# Patient Record
Sex: FEMALE | Race: WHITE | Employment: FULL TIME | URBAN - METROPOLITAN AREA
[De-identification: names, ages, dates, MRNs, and addresses within clinical notes are randomized per-mention and may not be internally consistent; named-entity substitution may affect disease eponyms.]

---

## 2017-01-10 DIAGNOSIS — M79.605 LUMBAR PAIN WITH RADIATION DOWN LEFT LEG: ICD-10-CM

## 2017-01-10 DIAGNOSIS — M54.50 LUMBAR PAIN WITH RADIATION DOWN LEFT LEG: ICD-10-CM

## 2017-01-10 DIAGNOSIS — M51.36 DDD (DEGENERATIVE DISC DISEASE), LUMBAR: ICD-10-CM

## 2017-01-10 RX ORDER — IBUPROFEN 800 MG/1
TABLET ORAL
Qty: 90 TABLET | Refills: 2 | Status: SHIPPED | OUTPATIENT
Start: 2017-01-10 | End: 2017-02-02

## 2017-02-02 ENCOUNTER — OFFICE VISIT (OUTPATIENT)
Dept: PRIMARY CARE CLINIC | Age: 57
End: 2017-02-02

## 2017-02-02 VITALS
RESPIRATION RATE: 16 BRPM | WEIGHT: 183 LBS | BODY MASS INDEX: 28.72 KG/M2 | DIASTOLIC BLOOD PRESSURE: 80 MMHG | HEART RATE: 50 BPM | HEIGHT: 67 IN | TEMPERATURE: 97.5 F | OXYGEN SATURATION: 99 % | SYSTOLIC BLOOD PRESSURE: 110 MMHG

## 2017-02-02 DIAGNOSIS — E78.2 MIXED HYPERLIPIDEMIA: ICD-10-CM

## 2017-02-02 DIAGNOSIS — I10 HTN (HYPERTENSION), BENIGN: ICD-10-CM

## 2017-02-02 DIAGNOSIS — M51.36 DDD (DEGENERATIVE DISC DISEASE), LUMBAR: Primary | ICD-10-CM

## 2017-02-02 DIAGNOSIS — J43.2 CENTRILOBULAR EMPHYSEMA (HCC): ICD-10-CM

## 2017-02-02 DIAGNOSIS — F17.200 SMOKING: ICD-10-CM

## 2017-02-02 DIAGNOSIS — M19.90 ARTHRITIS: ICD-10-CM

## 2017-02-02 PROCEDURE — 99213 OFFICE O/P EST LOW 20 MIN: CPT | Performed by: FAMILY MEDICINE

## 2017-02-02 RX ORDER — POTASSIUM CHLORIDE 750 MG/1
10 TABLET, EXTENDED RELEASE ORAL DAILY
Qty: 30 TABLET | Refills: 3 | Status: SHIPPED | OUTPATIENT
Start: 2017-02-02 | End: 2018-04-25

## 2017-02-02 RX ORDER — TRAMADOL HYDROCHLORIDE 50 MG/1
TABLET ORAL
Qty: 180 TABLET | Refills: 0 | Status: SHIPPED | OUTPATIENT
Start: 2017-02-02 | End: 2017-03-29 | Stop reason: SDUPTHER

## 2017-02-02 RX ORDER — ALBUTEROL SULFATE 2.5 MG/3ML
2.5 SOLUTION RESPIRATORY (INHALATION) EVERY 6 HOURS PRN
Qty: 120 EACH | Refills: 2 | Status: SHIPPED | OUTPATIENT
Start: 2017-02-02 | End: 2017-02-02

## 2017-02-02 ASSESSMENT — ENCOUNTER SYMPTOMS
CONSTIPATION: 0
ABDOMINAL PAIN: 0
DIARRHEA: 0

## 2017-02-12 ASSESSMENT — ENCOUNTER SYMPTOMS
SHORTNESS OF BREATH: 0
BACK PAIN: 1

## 2017-03-28 DIAGNOSIS — M19.90 ARTHRITIS: ICD-10-CM

## 2017-03-28 RX ORDER — TRAMADOL HYDROCHLORIDE 50 MG/1
TABLET ORAL
Qty: 180 TABLET | Refills: 0 | OUTPATIENT
Start: 2017-03-28

## 2017-03-28 RX ORDER — AMLODIPINE BESYLATE 5 MG/1
TABLET ORAL
Qty: 30 TABLET | Refills: 3 | OUTPATIENT
Start: 2017-03-28

## 2017-03-29 RX ORDER — AMLODIPINE BESYLATE 5 MG/1
TABLET ORAL
Qty: 30 TABLET | Refills: 3 | Status: SHIPPED | OUTPATIENT
Start: 2017-03-29 | End: 2017-08-30 | Stop reason: SDUPTHER

## 2017-03-29 RX ORDER — TRAMADOL HYDROCHLORIDE 50 MG/1
TABLET ORAL
Qty: 60 TABLET | Refills: 0 | Status: SHIPPED | OUTPATIENT
Start: 2017-03-29 | End: 2017-04-18 | Stop reason: SDUPTHER

## 2017-04-18 DIAGNOSIS — E78.2 MIXED HYPERLIPIDEMIA: ICD-10-CM

## 2017-04-18 DIAGNOSIS — M19.90 ARTHRITIS: ICD-10-CM

## 2017-04-18 LAB
ALBUMIN SERPL-MCNC: 4.4 G/DL (ref 3.9–4.9)
ALP BLD-CCNC: 58 U/L (ref 40–130)
ALT SERPL-CCNC: 26 U/L (ref 0–33)
ANION GAP SERPL CALCULATED.3IONS-SCNC: 16 MEQ/L (ref 7–13)
AST SERPL-CCNC: 23 U/L (ref 0–35)
BILIRUB SERPL-MCNC: 0.3 MG/DL (ref 0–1.2)
BUN BLDV-MCNC: 13 MG/DL (ref 6–20)
CALCIUM SERPL-MCNC: 10 MG/DL (ref 8.6–10.2)
CHLORIDE BLD-SCNC: 97 MEQ/L (ref 98–107)
CHOLESTEROL, TOTAL: 189 MG/DL (ref 0–199)
CO2: 24 MEQ/L (ref 22–29)
CREAT SERPL-MCNC: 0.78 MG/DL (ref 0.5–0.9)
GFR AFRICAN AMERICAN: >60
GFR NON-AFRICAN AMERICAN: >60
GLOBULIN: 2.6 G/DL (ref 2.3–3.5)
GLUCOSE BLD-MCNC: 110 MG/DL (ref 74–109)
HDLC SERPL-MCNC: 59 MG/DL (ref 40–59)
LDL CHOLESTEROL CALCULATED: 105 MG/DL (ref 0–129)
POTASSIUM SERPL-SCNC: 3.6 MEQ/L (ref 3.5–5.1)
SODIUM BLD-SCNC: 137 MEQ/L (ref 132–144)
TOTAL PROTEIN: 7 G/DL (ref 6.4–8.1)
TRIGL SERPL-MCNC: 127 MG/DL (ref 0–200)

## 2017-04-18 RX ORDER — TRAMADOL HYDROCHLORIDE 50 MG/1
TABLET ORAL
Qty: 60 TABLET | Refills: 0 | Status: SHIPPED | OUTPATIENT
Start: 2017-04-18 | End: 2017-05-19 | Stop reason: SDUPTHER

## 2017-04-24 DIAGNOSIS — I10 HTN (HYPERTENSION), BENIGN: ICD-10-CM

## 2017-04-25 RX ORDER — BENAZEPRIL HYDROCHLORIDE 40 MG/1
TABLET, FILM COATED ORAL
Qty: 90 TABLET | Refills: 1 | Status: SHIPPED | OUTPATIENT
Start: 2017-04-25 | End: 2017-09-18 | Stop reason: SDUPTHER

## 2017-05-10 DIAGNOSIS — I10 HTN (HYPERTENSION), BENIGN: ICD-10-CM

## 2017-05-10 RX ORDER — BISOPROLOL FUMARATE AND HYDROCHLOROTHIAZIDE 5; 6.25 MG/1; MG/1
TABLET ORAL
Qty: 90 TABLET | Refills: 1 | Status: SHIPPED | OUTPATIENT
Start: 2017-05-10 | End: 2017-11-03 | Stop reason: SDUPTHER

## 2017-05-19 DIAGNOSIS — M19.90 ARTHRITIS: ICD-10-CM

## 2017-05-19 RX ORDER — TRAMADOL HYDROCHLORIDE 50 MG/1
TABLET ORAL
Qty: 60 TABLET | Refills: 1 | Status: SHIPPED | OUTPATIENT
Start: 2017-05-19 | End: 2017-06-19 | Stop reason: SDUPTHER

## 2017-06-09 DIAGNOSIS — I10 HTN (HYPERTENSION), BENIGN: ICD-10-CM

## 2017-06-09 RX ORDER — HYDROCHLOROTHIAZIDE 25 MG/1
TABLET ORAL
Qty: 90 TABLET | Refills: 0 | Status: SHIPPED | OUTPATIENT
Start: 2017-06-09 | End: 2017-08-21 | Stop reason: SDUPTHER

## 2017-06-19 ENCOUNTER — OFFICE VISIT (OUTPATIENT)
Dept: PRIMARY CARE CLINIC | Age: 57
End: 2017-06-19

## 2017-06-19 VITALS
RESPIRATION RATE: 16 BRPM | SYSTOLIC BLOOD PRESSURE: 118 MMHG | HEART RATE: 72 BPM | OXYGEN SATURATION: 99 % | DIASTOLIC BLOOD PRESSURE: 72 MMHG | HEIGHT: 67 IN | WEIGHT: 181 LBS | TEMPERATURE: 98.2 F | BODY MASS INDEX: 28.41 KG/M2

## 2017-06-19 DIAGNOSIS — M51.36 DDD (DEGENERATIVE DISC DISEASE), LUMBAR: ICD-10-CM

## 2017-06-19 DIAGNOSIS — I10 HTN (HYPERTENSION), BENIGN: ICD-10-CM

## 2017-06-19 DIAGNOSIS — M19.90 ARTHRITIS: ICD-10-CM

## 2017-06-19 DIAGNOSIS — M79.672 FOOT PAIN, LEFT: Primary | ICD-10-CM

## 2017-06-19 PROCEDURE — 99213 OFFICE O/P EST LOW 20 MIN: CPT | Performed by: FAMILY MEDICINE

## 2017-06-19 RX ORDER — IBUPROFEN 800 MG/1
1 TABLET ORAL 3 TIMES DAILY
Refills: 2 | COMMUNITY
Start: 2017-06-13 | End: 2017-09-18

## 2017-06-19 RX ORDER — TRAMADOL HYDROCHLORIDE 50 MG/1
TABLET ORAL
Qty: 60 TABLET | Refills: 1 | Status: SHIPPED | OUTPATIENT
Start: 2017-06-19 | End: 2017-07-24 | Stop reason: SDUPTHER

## 2017-06-19 ASSESSMENT — ENCOUNTER SYMPTOMS
NAUSEA: 0
ABDOMINAL PAIN: 0
VOMITING: 0
DIARRHEA: 0
SORE THROAT: 0
CONSTIPATION: 0
COUGH: 0
SHORTNESS OF BREATH: 0
SINUS PRESSURE: 0
BACK PAIN: 0
RESPIRATORY NEGATIVE: 1
WHEEZING: 0

## 2017-06-19 ASSESSMENT — PATIENT HEALTH QUESTIONNAIRE - PHQ9
SUM OF ALL RESPONSES TO PHQ9 QUESTIONS 1 & 2: 0
SUM OF ALL RESPONSES TO PHQ QUESTIONS 1-9: 0
2. FEELING DOWN, DEPRESSED OR HOPELESS: 0
1. LITTLE INTEREST OR PLEASURE IN DOING THINGS: 0

## 2017-07-24 DIAGNOSIS — M19.90 ARTHRITIS: ICD-10-CM

## 2017-07-24 RX ORDER — TRAMADOL HYDROCHLORIDE 50 MG/1
TABLET ORAL
Qty: 60 TABLET | Refills: 0 | Status: SHIPPED | OUTPATIENT
Start: 2017-07-24 | End: 2017-08-07 | Stop reason: SDUPTHER

## 2017-08-10 DIAGNOSIS — M54.50 LUMBAR PAIN WITH RADIATION DOWN LEFT LEG: ICD-10-CM

## 2017-08-10 DIAGNOSIS — M79.605 LUMBAR PAIN WITH RADIATION DOWN LEFT LEG: ICD-10-CM

## 2017-08-10 DIAGNOSIS — M51.36 DDD (DEGENERATIVE DISC DISEASE), LUMBAR: ICD-10-CM

## 2017-08-10 DIAGNOSIS — M19.90 ARTHRITIS: ICD-10-CM

## 2017-08-10 NOTE — TELEPHONE ENCOUNTER
From: Naty Silverio  To: Brunilda Mortimer, DO  Sent: 8/10/2017 8:36 AM EDT  Subject: Medication Renewal Request    Original authorizing provider: Brunilda Mortimer, DO Levada Blood would like a refill of the following medications:  ibuprofen (ADVIL;MOTRIN) 800 MG tablet Brunilda Mortimer, DO]  traMADol Nii Anchors) 50 MG tablet Brunilda Mortimer, DO]    Preferred pharmacy: [facility] [place] #84 - [address] [facility]. 97. [phone]    Comment:  need refills

## 2017-08-11 RX ORDER — IBUPROFEN 800 MG/1
TABLET ORAL
Qty: 90 TABLET | Refills: 3 | OUTPATIENT
Start: 2017-08-11

## 2017-08-11 RX ORDER — TRAMADOL HYDROCHLORIDE 50 MG/1
TABLET ORAL
Qty: 60 TABLET | Refills: 0 | OUTPATIENT
Start: 2017-08-11

## 2017-08-21 DIAGNOSIS — I10 HTN (HYPERTENSION), BENIGN: ICD-10-CM

## 2017-08-21 RX ORDER — HYDROCHLOROTHIAZIDE 25 MG/1
TABLET ORAL
Qty: 90 TABLET | Refills: 1 | Status: SHIPPED | OUTPATIENT
Start: 2017-08-21 | End: 2017-11-07 | Stop reason: SDUPTHER

## 2017-08-30 RX ORDER — AMLODIPINE BESYLATE 5 MG/1
TABLET ORAL
Qty: 30 TABLET | Refills: 5 | Status: SHIPPED | OUTPATIENT
Start: 2017-08-30 | End: 2018-01-25

## 2017-09-18 ENCOUNTER — OFFICE VISIT (OUTPATIENT)
Dept: PRIMARY CARE CLINIC | Age: 57
End: 2017-09-18

## 2017-09-18 VITALS
WEIGHT: 180 LBS | RESPIRATION RATE: 16 BRPM | SYSTOLIC BLOOD PRESSURE: 126 MMHG | OXYGEN SATURATION: 96 % | HEIGHT: 68 IN | DIASTOLIC BLOOD PRESSURE: 74 MMHG | HEART RATE: 87 BPM | TEMPERATURE: 98.7 F | BODY MASS INDEX: 27.28 KG/M2

## 2017-09-18 DIAGNOSIS — M79.672 FOOT PAIN, LEFT: Primary | ICD-10-CM

## 2017-09-18 DIAGNOSIS — M19.90 ARTHRITIS: ICD-10-CM

## 2017-09-18 DIAGNOSIS — I10 HTN (HYPERTENSION), BENIGN: ICD-10-CM

## 2017-09-18 PROCEDURE — 99214 OFFICE O/P EST MOD 30 MIN: CPT | Performed by: FAMILY MEDICINE

## 2017-09-18 RX ORDER — TRAMADOL HYDROCHLORIDE 50 MG/1
50-100 TABLET ORAL EVERY 6 HOURS PRN
Qty: 120 TABLET | Refills: 1 | Status: SHIPPED | OUTPATIENT
Start: 2017-09-18 | End: 2017-11-07 | Stop reason: SDUPTHER

## 2017-09-18 RX ORDER — LIDOCAINE 50 MG/G
1 PATCH TOPICAL DAILY
Qty: 30 PATCH | Refills: 0 | Status: SHIPPED | OUTPATIENT
Start: 2017-09-18 | End: 2018-11-26 | Stop reason: SDUPTHER

## 2017-09-18 RX ORDER — TRAMADOL HYDROCHLORIDE 50 MG/1
TABLET ORAL
Qty: 60 TABLET | Refills: 1 | Status: SHIPPED | OUTPATIENT
Start: 2017-09-18 | End: 2017-09-18

## 2017-09-18 RX ORDER — METHOCARBAMOL 500 MG/1
500-1000 TABLET, FILM COATED ORAL 3 TIMES DAILY
Qty: 60 TABLET | Refills: 1 | Status: SHIPPED | OUTPATIENT
Start: 2017-09-18 | End: 2017-12-22 | Stop reason: SDUPTHER

## 2017-09-18 RX ORDER — BENAZEPRIL HYDROCHLORIDE 40 MG/1
TABLET, FILM COATED ORAL
Qty: 90 TABLET | Refills: 1 | Status: SHIPPED | OUTPATIENT
Start: 2017-09-18 | End: 2017-11-07 | Stop reason: SDUPTHER

## 2017-09-18 RX ORDER — CYCLOBENZAPRINE HCL 5 MG
5 TABLET ORAL 3 TIMES DAILY PRN
Status: CANCELLED | OUTPATIENT
Start: 2017-09-18 | End: 2017-09-28

## 2017-09-18 ASSESSMENT — ENCOUNTER SYMPTOMS
BACK PAIN: 0
DIARRHEA: 0
ORTHOPNEA: 0
RESPIRATORY NEGATIVE: 1
SORE THROAT: 0
CONSTIPATION: 0
VOMITING: 0
WHEEZING: 0
BLURRED VISION: 0
NAUSEA: 0
SHORTNESS OF BREATH: 0
COUGH: 0
ABDOMINAL PAIN: 0
SINUS PRESSURE: 0

## 2017-10-27 DIAGNOSIS — M51.36 DDD (DEGENERATIVE DISC DISEASE), LUMBAR: ICD-10-CM

## 2017-10-27 DIAGNOSIS — M79.605 LUMBAR PAIN WITH RADIATION DOWN LEFT LEG: ICD-10-CM

## 2017-10-27 DIAGNOSIS — M54.50 LUMBAR PAIN WITH RADIATION DOWN LEFT LEG: ICD-10-CM

## 2017-10-27 NOTE — TELEPHONE ENCOUNTER
From: Naty Blood  Sent: 10/27/2017 10:30 AM EDT  Subject: Medication Renewal Request    Naty Nusrat would like a refill of the following medications:  ibuprofen (ADVIL;MOTRIN) 800 MG tablet Brunilda Mortimer, DO]    Preferred pharmacy: [facility] #84 - [address] [facility]. 97. [phone]    Comment:  need refill

## 2017-10-29 DIAGNOSIS — M51.36 DDD (DEGENERATIVE DISC DISEASE), LUMBAR: ICD-10-CM

## 2017-10-29 DIAGNOSIS — M79.605 LUMBAR PAIN WITH RADIATION DOWN LEFT LEG: ICD-10-CM

## 2017-10-29 DIAGNOSIS — M54.50 LUMBAR PAIN WITH RADIATION DOWN LEFT LEG: ICD-10-CM

## 2017-10-29 RX ORDER — IBUPROFEN 800 MG/1
800 TABLET ORAL 3 TIMES DAILY
Qty: 90 TABLET | Refills: 2 | Status: SHIPPED | OUTPATIENT
Start: 2017-10-29 | End: 2018-04-25 | Stop reason: SDUPTHER

## 2017-10-30 RX ORDER — IBUPROFEN 800 MG/1
TABLET ORAL
Qty: 90 TABLET | Refills: 1 | Status: SHIPPED | OUTPATIENT
Start: 2017-10-30 | End: 2017-12-22 | Stop reason: SDUPTHER

## 2017-11-03 DIAGNOSIS — I10 HTN (HYPERTENSION), BENIGN: ICD-10-CM

## 2017-11-04 RX ORDER — BISOPROLOL FUMARATE AND HYDROCHLOROTHIAZIDE 5; 6.25 MG/1; MG/1
TABLET ORAL
Qty: 90 TABLET | Refills: 1 | Status: SHIPPED | OUTPATIENT
Start: 2017-11-04 | End: 2018-05-11 | Stop reason: SDUPTHER

## 2017-11-07 DIAGNOSIS — M79.672 FOOT PAIN, LEFT: ICD-10-CM

## 2017-11-07 DIAGNOSIS — M19.90 ARTHRITIS: ICD-10-CM

## 2017-11-07 DIAGNOSIS — I10 HTN (HYPERTENSION), BENIGN: ICD-10-CM

## 2017-11-07 RX ORDER — BENAZEPRIL HYDROCHLORIDE 40 MG/1
TABLET, FILM COATED ORAL
Qty: 90 TABLET | Refills: 1 | Status: SHIPPED | OUTPATIENT
Start: 2017-11-07 | End: 2018-09-14

## 2017-11-07 RX ORDER — TRAMADOL HYDROCHLORIDE 50 MG/1
50-100 TABLET ORAL EVERY 6 HOURS PRN
Qty: 120 TABLET | Refills: 1 | Status: SHIPPED | OUTPATIENT
Start: 2017-11-07 | End: 2017-12-22 | Stop reason: SDUPTHER

## 2017-11-07 RX ORDER — HYDROCHLOROTHIAZIDE 25 MG/1
TABLET ORAL
Qty: 90 TABLET | Refills: 1 | Status: SHIPPED | OUTPATIENT
Start: 2017-11-07 | End: 2018-04-25

## 2017-11-07 NOTE — TELEPHONE ENCOUNTER
From: Micheal Julien  Sent: 11/7/2017 6:13 AM EST  Subject: Medication Renewal Request    Micheal Julien would like a refill of the following medications:  hydrochlorothiazide (HYDRODIURIL) 25 MG tablet Emily Ludwig DO]  benazepril (LOTENSIN) 40 MG tablet Emily Ludwig DO]  traMADol Narayan Kathrin) 50 MG tablet Emily Ludwig DO]    Preferred pharmacy: [facility] [place] #84 - [address] [facility]. 97. [phone]    Comment:  Need refills Cold weather kicking my back, hip

## 2017-12-15 ENCOUNTER — OFFICE VISIT (OUTPATIENT)
Dept: PRIMARY CARE CLINIC | Age: 57
End: 2017-12-15

## 2017-12-15 VITALS
SYSTOLIC BLOOD PRESSURE: 118 MMHG | HEIGHT: 67 IN | TEMPERATURE: 98.2 F | HEART RATE: 73 BPM | WEIGHT: 182 LBS | DIASTOLIC BLOOD PRESSURE: 74 MMHG | RESPIRATION RATE: 18 BRPM | BODY MASS INDEX: 28.56 KG/M2 | OXYGEN SATURATION: 97 %

## 2017-12-15 DIAGNOSIS — M51.36 DDD (DEGENERATIVE DISC DISEASE), LUMBAR: ICD-10-CM

## 2017-12-15 DIAGNOSIS — M54.16 LUMBAR BACK PAIN WITH RADICULOPATHY AFFECTING LEFT LOWER EXTREMITY: Primary | ICD-10-CM

## 2017-12-15 DIAGNOSIS — I10 HTN (HYPERTENSION), BENIGN: ICD-10-CM

## 2017-12-15 PROCEDURE — 99214 OFFICE O/P EST MOD 30 MIN: CPT | Performed by: FAMILY MEDICINE

## 2017-12-15 RX ORDER — HYDROCODONE BITARTRATE AND ACETAMINOPHEN 5; 325 MG/1; MG/1
1 TABLET ORAL EVERY 6 HOURS PRN
Status: CANCELLED | OUTPATIENT
Start: 2017-12-15 | End: 2017-12-22

## 2017-12-15 RX ORDER — PREGABALIN 75 MG/1
75 CAPSULE ORAL 2 TIMES DAILY
Qty: 28 CAPSULE | Refills: 0 | Status: SHIPPED | OUTPATIENT
Start: 2017-12-15 | End: 2018-01-25

## 2017-12-15 RX ORDER — PREGABALIN 75 MG/1
75 CAPSULE ORAL 2 TIMES DAILY
Qty: 60 CAPSULE | Refills: 2 | Status: SHIPPED | OUTPATIENT
Start: 2017-12-15 | End: 2018-01-25

## 2017-12-15 RX ORDER — PREDNISONE 10 MG/1
TABLET ORAL
Qty: 20 TABLET | Refills: 0 | Status: SHIPPED | OUTPATIENT
Start: 2017-12-15 | End: 2017-12-29

## 2017-12-15 ASSESSMENT — ENCOUNTER SYMPTOMS
PHOTOPHOBIA: 0
RESPIRATORY NEGATIVE: 1
EYES NEGATIVE: 1
ABDOMINAL PAIN: 0
SHORTNESS OF BREATH: 0
GASTROINTESTINAL NEGATIVE: 1
BACK PAIN: 0
WHEEZING: 0
CONSTIPATION: 0
DIARRHEA: 0
EYE ITCHING: 0
EYE REDNESS: 0

## 2017-12-15 NOTE — PROGRESS NOTES
Subjective:      Patient ID: Zaid Almeida is a 56 y.o. female who presents today for:  Chief Complaint   Patient presents with    Pain     x 2 months pt has c/o ledt sided siatica pain. patinet states has not changed and is not improving. pt is requesting a Rx for vicodin. paitne is currently takin tramadol and ibuprofen with no relief. Leg Pain    The incident occurred more than 1 week ago. There was no injury mechanism. The pain is present in the left leg, left thigh and right thigh. The quality of the pain is described as aching. The pain is at a severity of 4/10. Pain severity now: mild to moderate. The pain has been constant since onset. Associated symptoms include numbness and tingling. Pertinent negatives include no inability to bear weight, loss of motion, loss of sensation or muscle weakness. She reports no foreign bodies present. The symptoms are aggravated by movement and weight bearing. She has tried acetaminophen (Tramadol) for the symptoms. The treatment provided no relief. Past Medical History:   Diagnosis Date    Anemia     Arthritis     Hypertension     Smoking      Past Surgical History:   Procedure Laterality Date    COLONOSCOPY  12/02/2016    JAIMIE BANUELOS MD    DILATION AND CURETTAGE OF UTERUS       History reviewed. No pertinent family history. Social History     Social History    Marital status:      Spouse name: N/A    Number of children: N/A    Years of education: N/A     Occupational History    Not on file. Social History Main Topics    Smoking status: Current Every Day Smoker     Packs/day: 0.30     Types: Cigarettes    Smokeless tobacco: Never Used    Alcohol use Yes    Drug use: No    Sexual activity: Not on file     Other Topics Concern    Not on file     Social History Narrative    No narrative on file     Allergies:  Review of patient's allergies indicates not on file. Review of Systems   Constitutional: Negative.   Negative for activity

## 2017-12-22 DIAGNOSIS — M79.605 LUMBAR PAIN WITH RADIATION DOWN LEFT LEG: ICD-10-CM

## 2017-12-22 DIAGNOSIS — M19.90 ARTHRITIS: ICD-10-CM

## 2017-12-22 DIAGNOSIS — M51.36 DDD (DEGENERATIVE DISC DISEASE), LUMBAR: ICD-10-CM

## 2017-12-22 DIAGNOSIS — M54.50 LUMBAR PAIN WITH RADIATION DOWN LEFT LEG: ICD-10-CM

## 2017-12-22 DIAGNOSIS — M79.672 FOOT PAIN, LEFT: ICD-10-CM

## 2017-12-22 RX ORDER — IBUPROFEN 800 MG/1
800 TABLET ORAL EVERY 8 HOURS PRN
Qty: 90 TABLET | Refills: 2 | Status: SHIPPED | OUTPATIENT
Start: 2017-12-22 | End: 2018-01-25

## 2017-12-22 RX ORDER — TRAMADOL HYDROCHLORIDE 50 MG/1
50-100 TABLET ORAL EVERY 6 HOURS PRN
Qty: 120 TABLET | Refills: 1 | Status: SHIPPED | OUTPATIENT
Start: 2017-12-22 | End: 2018-02-13 | Stop reason: SDUPTHER

## 2017-12-22 RX ORDER — METHOCARBAMOL 500 MG/1
500-1000 TABLET, FILM COATED ORAL 3 TIMES DAILY
Qty: 90 TABLET | Refills: 2 | Status: SHIPPED | OUTPATIENT
Start: 2017-12-22 | End: 2018-06-18 | Stop reason: SDUPTHER

## 2018-01-25 ENCOUNTER — OFFICE VISIT (OUTPATIENT)
Dept: PRIMARY CARE CLINIC | Age: 58
End: 2018-01-25
Payer: COMMERCIAL

## 2018-01-25 VITALS
WEIGHT: 182.4 LBS | BODY MASS INDEX: 28.63 KG/M2 | HEIGHT: 67 IN | RESPIRATION RATE: 12 BRPM | DIASTOLIC BLOOD PRESSURE: 78 MMHG | SYSTOLIC BLOOD PRESSURE: 108 MMHG | HEART RATE: 68 BPM | TEMPERATURE: 97.7 F

## 2018-01-25 DIAGNOSIS — I10 HTN (HYPERTENSION), BENIGN: Primary | ICD-10-CM

## 2018-01-25 DIAGNOSIS — E55.9 VITAMIN D DEFICIENCY: ICD-10-CM

## 2018-01-25 DIAGNOSIS — F17.200 SMOKING: ICD-10-CM

## 2018-01-25 DIAGNOSIS — Z12.31 SCREENING MAMMOGRAM, ENCOUNTER FOR: ICD-10-CM

## 2018-01-25 DIAGNOSIS — D51.9 ANEMIA DUE TO VITAMIN B12 DEFICIENCY, UNSPECIFIED B12 DEFICIENCY TYPE: ICD-10-CM

## 2018-01-25 DIAGNOSIS — E78.5 DYSLIPIDEMIA: ICD-10-CM

## 2018-01-25 DIAGNOSIS — M51.36 DDD (DEGENERATIVE DISC DISEASE), LUMBAR: ICD-10-CM

## 2018-01-25 PROCEDURE — 99213 OFFICE O/P EST LOW 20 MIN: CPT | Performed by: FAMILY MEDICINE

## 2018-01-25 RX ORDER — AMLODIPINE BESYLATE 10 MG/1
TABLET ORAL
Refills: 5 | COMMUNITY
Start: 2018-01-05 | End: 2018-01-25

## 2018-01-25 ASSESSMENT — ENCOUNTER SYMPTOMS
WHEEZING: 0
SINUS PRESSURE: 0
NAUSEA: 0
DIARRHEA: 0
RESPIRATORY NEGATIVE: 1
VOMITING: 0
SHORTNESS OF BREATH: 0
SORE THROAT: 0
CONSTIPATION: 0
ABDOMINAL PAIN: 0
BACK PAIN: 0
BLURRED VISION: 0
COUGH: 0

## 2018-01-25 NOTE — PROGRESS NOTES
chills, fatigue and fever. HENT: Negative for congestion, ear pain, sinus pressure and sore throat. Eyes: Negative for blurred vision. Respiratory: Negative. Negative for cough, shortness of breath and wheezing. Cardiovascular: Negative for chest pain and palpitations. Gastrointestinal: Negative for abdominal pain, constipation, diarrhea, nausea and vomiting. Musculoskeletal: Negative for back pain and neck pain. Neurological: Negative for dizziness and headaches. Objective:   /78 (Site: Left Arm, Position: Sitting, Cuff Size: Large Adult)   Pulse 68   Temp 97.7 °F (36.5 °C) (Oral)   Resp 12   Ht 5' 7\" (1.702 m)   Wt 182 lb 6.4 oz (82.7 kg)   LMP  (LMP Unknown)   BMI 28.57 kg/m²     Physical Exam   Constitutional: She is oriented to person, place, and time. She appears well-developed and well-nourished. HENT:   Head: Normocephalic and atraumatic. Eyes: Conjunctivae and EOM are normal. Pupils are equal, round, and reactive to light. Neck: Normal range of motion. Neck supple. No thyromegaly present. Cardiovascular: Normal rate, regular rhythm and normal heart sounds. No murmur heard. Pulmonary/Chest: Effort normal and breath sounds normal. She has no wheezes. She exhibits no tenderness. Abdominal: Soft. Bowel sounds are normal. There is no tenderness. Musculoskeletal: Normal range of motion. She exhibits no edema or tenderness. Lymphadenopathy:     She has no cervical adenopathy. Neurological: She is alert and oriented to person, place, and time. She has normal reflexes. Coordination normal.   Skin: Skin is warm and dry. No rash noted. Psychiatric: She has a normal mood and affect. Thought content normal.   Nursing note and vitals reviewed. Assessment:     1. HTN (hypertension), benign  DISCONTINUED: amLODIPine (NORVASC) 10 MG tablet   2. DDD (degenerative disc disease), lumbar     3. Smoking     4.  Screening mammogram, encounter for  JASMIN SCREENING W CAD

## 2018-02-13 DIAGNOSIS — M19.90 ARTHRITIS: ICD-10-CM

## 2018-02-13 DIAGNOSIS — M79.672 FOOT PAIN, LEFT: ICD-10-CM

## 2018-02-13 RX ORDER — TRAMADOL HYDROCHLORIDE 50 MG/1
50-100 TABLET ORAL EVERY 6 HOURS PRN
Qty: 120 TABLET | Refills: 1 | Status: SHIPPED | OUTPATIENT
Start: 2018-02-13 | End: 2018-03-15

## 2018-02-13 NOTE — TELEPHONE ENCOUNTER
From: Danielle Johnston  Sent: 2/12/2018 8:15 PM EST  Subject: Medication Renewal Request    Danielle Johnston would like a refill of the following medications:  traMADol (ULTRAM) 50 MG tablet JULIA Rachel    Preferred pharmacy: [facility] [place] #84 - [address] [facility] 97. [phone]    Comment:  please refill

## 2018-04-10 ENCOUNTER — PATIENT MESSAGE (OUTPATIENT)
Dept: PRIMARY CARE CLINIC | Age: 58
End: 2018-04-10

## 2018-04-10 DIAGNOSIS — M79.672 FOOT PAIN, LEFT: ICD-10-CM

## 2018-04-10 DIAGNOSIS — M19.90 ARTHRITIS: ICD-10-CM

## 2018-04-12 RX ORDER — TRAMADOL HYDROCHLORIDE 50 MG/1
50-100 TABLET ORAL EVERY 6 HOURS PRN
Qty: 120 TABLET | Refills: 0 | Status: SHIPPED | OUTPATIENT
Start: 2018-04-12 | End: 2018-04-25

## 2018-04-25 ENCOUNTER — OFFICE VISIT (OUTPATIENT)
Dept: PRIMARY CARE CLINIC | Age: 58
End: 2018-04-25
Payer: COMMERCIAL

## 2018-04-25 VITALS
DIASTOLIC BLOOD PRESSURE: 76 MMHG | HEIGHT: 67 IN | OXYGEN SATURATION: 98 % | TEMPERATURE: 98.2 F | WEIGHT: 184.3 LBS | HEART RATE: 68 BPM | RESPIRATION RATE: 12 BRPM | BODY MASS INDEX: 28.93 KG/M2 | SYSTOLIC BLOOD PRESSURE: 108 MMHG

## 2018-04-25 DIAGNOSIS — R60.9 PERIPHERAL EDEMA: ICD-10-CM

## 2018-04-25 DIAGNOSIS — I10 HTN (HYPERTENSION), BENIGN: ICD-10-CM

## 2018-04-25 DIAGNOSIS — M79.605 LUMBAR PAIN WITH RADIATION DOWN LEFT LEG: ICD-10-CM

## 2018-04-25 DIAGNOSIS — M54.50 LUMBAR PAIN WITH RADIATION DOWN LEFT LEG: ICD-10-CM

## 2018-04-25 DIAGNOSIS — M79.672 FOOT PAIN, LEFT: Primary | ICD-10-CM

## 2018-04-25 DIAGNOSIS — M51.36 DDD (DEGENERATIVE DISC DISEASE), LUMBAR: ICD-10-CM

## 2018-04-25 PROCEDURE — 99214 OFFICE O/P EST MOD 30 MIN: CPT | Performed by: FAMILY MEDICINE

## 2018-04-25 RX ORDER — FUROSEMIDE 20 MG/1
20 TABLET ORAL DAILY
Qty: 60 TABLET | Refills: 1 | Status: SHIPPED | OUTPATIENT
Start: 2018-04-25 | End: 2018-06-28

## 2018-04-25 RX ORDER — AMLODIPINE BESYLATE 10 MG/1
10 TABLET ORAL DAILY
COMMUNITY
End: 2018-05-17 | Stop reason: SDUPTHER

## 2018-04-25 RX ORDER — IBUPROFEN 800 MG/1
800 TABLET ORAL 3 TIMES DAILY
Qty: 90 TABLET | Refills: 2 | Status: SHIPPED | OUTPATIENT
Start: 2018-04-25 | End: 2018-07-11 | Stop reason: SDUPTHER

## 2018-04-25 ASSESSMENT — ENCOUNTER SYMPTOMS
EYE REDNESS: 0
EYE PAIN: 0
COUGH: 0
DIARRHEA: 0
PHOTOPHOBIA: 0
CHOKING: 0
WHEEZING: 0
ABDOMINAL PAIN: 0
SHORTNESS OF BREATH: 0
SORE THROAT: 0
APNEA: 0
ORTHOPNEA: 0
VOMITING: 0
CHEST TIGHTNESS: 0
FACIAL SWELLING: 0
EYE DISCHARGE: 0
STRIDOR: 0
BLURRED VISION: 0
CONSTIPATION: 0
NAUSEA: 0
SWOLLEN GLANDS: 0
VISUAL CHANGE: 0
CHANGE IN BOWEL HABIT: 0
COLOR CHANGE: 0

## 2018-05-11 DIAGNOSIS — M19.90 ARTHRITIS: ICD-10-CM

## 2018-05-11 DIAGNOSIS — I10 HTN (HYPERTENSION), BENIGN: ICD-10-CM

## 2018-05-12 RX ORDER — BENAZEPRIL HYDROCHLORIDE 40 MG/1
TABLET, FILM COATED ORAL
Qty: 90 TABLET | Refills: 1 | Status: SHIPPED | OUTPATIENT
Start: 2018-05-12 | End: 2018-06-28

## 2018-05-12 RX ORDER — BISOPROLOL FUMARATE AND HYDROCHLOROTHIAZIDE 5; 6.25 MG/1; MG/1
TABLET ORAL
Qty: 90 TABLET | Refills: 1 | Status: SHIPPED | OUTPATIENT
Start: 2018-05-12 | End: 2018-09-08 | Stop reason: SDUPTHER

## 2018-05-12 RX ORDER — TRAMADOL HYDROCHLORIDE 50 MG/1
TABLET ORAL
Qty: 120 TABLET | Refills: 0 | Status: SHIPPED | OUTPATIENT
Start: 2018-05-12 | End: 2018-06-08 | Stop reason: SDUPTHER

## 2018-05-14 DIAGNOSIS — E55.9 VITAMIN D DEFICIENCY: ICD-10-CM

## 2018-05-14 DIAGNOSIS — E78.5 DYSLIPIDEMIA: ICD-10-CM

## 2018-05-14 DIAGNOSIS — D51.9 ANEMIA DUE TO VITAMIN B12 DEFICIENCY, UNSPECIFIED B12 DEFICIENCY TYPE: ICD-10-CM

## 2018-05-14 LAB
ALBUMIN SERPL-MCNC: 4.6 G/DL (ref 3.9–4.9)
ALP BLD-CCNC: 57 U/L (ref 40–130)
ALT SERPL-CCNC: 18 U/L (ref 0–33)
ANION GAP SERPL CALCULATED.3IONS-SCNC: 16 MEQ/L (ref 7–13)
AST SERPL-CCNC: 16 U/L (ref 0–35)
BASOPHILS ABSOLUTE: 0 K/UL (ref 0–0.2)
BASOPHILS RELATIVE PERCENT: 0.5 %
BILIRUB SERPL-MCNC: 0.4 MG/DL (ref 0–1.2)
BUN BLDV-MCNC: 17 MG/DL (ref 6–20)
CALCIUM SERPL-MCNC: 10.1 MG/DL (ref 8.6–10.2)
CHLORIDE BLD-SCNC: 98 MEQ/L (ref 98–107)
CHOLESTEROL, TOTAL: 194 MG/DL (ref 0–199)
CO2: 26 MEQ/L (ref 22–29)
CREAT SERPL-MCNC: 0.78 MG/DL (ref 0.5–0.9)
EOSINOPHILS ABSOLUTE: 0.2 K/UL (ref 0–0.7)
EOSINOPHILS RELATIVE PERCENT: 2.4 %
GFR AFRICAN AMERICAN: >60
GFR NON-AFRICAN AMERICAN: >60
GLOBULIN: 2.2 G/DL (ref 2.3–3.5)
GLUCOSE BLD-MCNC: 94 MG/DL (ref 74–109)
HCT VFR BLD CALC: 38.7 % (ref 37–47)
HDLC SERPL-MCNC: 59 MG/DL (ref 40–59)
HEMOGLOBIN: 13.6 G/DL (ref 12–16)
LDL CHOLESTEROL CALCULATED: 111 MG/DL (ref 0–129)
LYMPHOCYTES ABSOLUTE: 2.3 K/UL (ref 1–4.8)
LYMPHOCYTES RELATIVE PERCENT: 30.4 %
MCH RBC QN AUTO: 31.1 PG (ref 27–31.3)
MCHC RBC AUTO-ENTMCNC: 35.2 % (ref 33–37)
MCV RBC AUTO: 88.3 FL (ref 82–100)
MONOCYTES ABSOLUTE: 0.5 K/UL (ref 0.2–0.8)
MONOCYTES RELATIVE PERCENT: 7 %
NEUTROPHILS ABSOLUTE: 4.5 K/UL (ref 1.4–6.5)
NEUTROPHILS RELATIVE PERCENT: 59.7 %
PDW BLD-RTO: 13.2 % (ref 11.5–14.5)
PLATELET # BLD: 332 K/UL (ref 130–400)
POTASSIUM SERPL-SCNC: 3.7 MEQ/L (ref 3.5–5.1)
RBC # BLD: 4.38 M/UL (ref 4.2–5.4)
SODIUM BLD-SCNC: 140 MEQ/L (ref 132–144)
TOTAL PROTEIN: 6.8 G/DL (ref 6.4–8.1)
TRIGL SERPL-MCNC: 118 MG/DL (ref 0–200)
VITAMIN D 25-HYDROXY: 30.6 NG/ML (ref 30–100)
WBC # BLD: 7.6 K/UL (ref 4.8–10.8)

## 2018-05-17 DIAGNOSIS — I10 HTN (HYPERTENSION), BENIGN: ICD-10-CM

## 2018-05-17 RX ORDER — AMLODIPINE BESYLATE 10 MG/1
10 TABLET ORAL DAILY
Qty: 90 TABLET | Refills: 1 | Status: SHIPPED | OUTPATIENT
Start: 2018-05-17 | End: 2018-06-28

## 2018-06-08 DIAGNOSIS — M19.90 ARTHRITIS: ICD-10-CM

## 2018-06-08 RX ORDER — TRAMADOL HYDROCHLORIDE 50 MG/1
TABLET ORAL
Qty: 120 TABLET | Refills: 0 | Status: SHIPPED | OUTPATIENT
Start: 2018-06-11 | End: 2018-07-09 | Stop reason: SDUPTHER

## 2018-06-18 DIAGNOSIS — M79.672 FOOT PAIN, LEFT: ICD-10-CM

## 2018-06-18 RX ORDER — METHOCARBAMOL 500 MG/1
500-1000 TABLET, FILM COATED ORAL 3 TIMES DAILY
Qty: 90 TABLET | Refills: 2 | Status: SHIPPED | OUTPATIENT
Start: 2018-06-18 | End: 2018-09-08 | Stop reason: SDUPTHER

## 2018-06-28 ENCOUNTER — OFFICE VISIT (OUTPATIENT)
Dept: PRIMARY CARE CLINIC | Age: 58
End: 2018-06-28
Payer: COMMERCIAL

## 2018-06-28 VITALS
DIASTOLIC BLOOD PRESSURE: 70 MMHG | OXYGEN SATURATION: 99 % | HEIGHT: 67 IN | BODY MASS INDEX: 28.97 KG/M2 | HEART RATE: 75 BPM | WEIGHT: 184.6 LBS | TEMPERATURE: 99.1 F | SYSTOLIC BLOOD PRESSURE: 106 MMHG | RESPIRATION RATE: 14 BRPM

## 2018-06-28 DIAGNOSIS — M19.90 ARTHRITIS: ICD-10-CM

## 2018-06-28 DIAGNOSIS — M51.36 DDD (DEGENERATIVE DISC DISEASE), LUMBAR: ICD-10-CM

## 2018-06-28 DIAGNOSIS — S96.912A ANKLE STRAIN, LEFT, INITIAL ENCOUNTER: ICD-10-CM

## 2018-06-28 DIAGNOSIS — I10 HTN (HYPERTENSION), BENIGN: Primary | ICD-10-CM

## 2018-06-28 PROCEDURE — 99214 OFFICE O/P EST MOD 30 MIN: CPT | Performed by: FAMILY MEDICINE

## 2018-06-28 ASSESSMENT — ENCOUNTER SYMPTOMS
RESPIRATORY NEGATIVE: 1
VOMITING: 0
ABDOMINAL PAIN: 0
DIARRHEA: 0
CONSTIPATION: 0
GASTROINTESTINAL NEGATIVE: 1
COUGH: 0
SWOLLEN GLANDS: 0
VISUAL CHANGE: 0
SHORTNESS OF BREATH: 0
BACK PAIN: 0
EYE REDNESS: 0
SORE THROAT: 0
EYE ITCHING: 0
CHANGE IN BOWEL HABIT: 0
WHEEZING: 0
NAUSEA: 0
PHOTOPHOBIA: 0
EYES NEGATIVE: 1

## 2018-06-28 ASSESSMENT — PATIENT HEALTH QUESTIONNAIRE - PHQ9
SUM OF ALL RESPONSES TO PHQ9 QUESTIONS 1 & 2: 0
1. LITTLE INTEREST OR PLEASURE IN DOING THINGS: 0
SUM OF ALL RESPONSES TO PHQ QUESTIONS 1-9: 0
2. FEELING DOWN, DEPRESSED OR HOPELESS: 0

## 2018-06-28 NOTE — PROGRESS NOTES
Other Topics Concern    Not on file     Social History Narrative    No narrative on file     Allergies:  Patient has no known allergies. Review of Systems   Constitutional: Negative. Negative for activity change, appetite change, chills, diaphoresis, fatigue and fever. HENT: Negative. Negative for congestion and sore throat. Eyes: Negative. Negative for photophobia, redness and itching. Respiratory: Negative. Negative for cough, shortness of breath and wheezing. Cardiovascular: Positive for leg swelling (right ankle). Negative for chest pain. Gastrointestinal: Negative. Negative for abdominal pain, anorexia, change in bowel habit, constipation, diarrhea, nausea and vomiting. Genitourinary: Negative. Negative for hematuria, pelvic pain and urgency. Musculoskeletal: Negative. Negative for arthralgias, back pain, joint swelling, myalgias and neck pain. Skin: Negative. Negative for rash. Neurological: Negative. Negative for vertigo, weakness, numbness and headaches. Psychiatric/Behavioral: Negative. Negative for agitation and behavioral problems. The patient is not nervous/anxious. Objective:   /70 (Site: Left Arm, Position: Sitting, Cuff Size: Medium Adult)   Pulse 75   Temp 99.1 °F (37.3 °C) (Tympanic)   Resp 14   Ht 5' 7\" (1.702 m)   Wt 184 lb 9.6 oz (83.7 kg)   LMP  (LMP Unknown)   SpO2 99%   BMI 28.91 kg/m²     Physical Exam   Constitutional: She is oriented to person, place, and time. She appears well-developed and well-nourished. HENT:   Head: Normocephalic and atraumatic. Eyes: Conjunctivae and EOM are normal. Pupils are equal, round, and reactive to light. Neck: Normal range of motion. Neck supple. No thyromegaly present. Cardiovascular: Normal rate, regular rhythm and normal heart sounds. No murmur heard. Pulmonary/Chest: Effort normal and breath sounds normal. She has no wheezes. She exhibits no tenderness. Abdominal: Soft.  Bowel sounds are normal. There is no tenderness. Musculoskeletal: Normal range of motion. She exhibits edema. She exhibits no tenderness. Lymphadenopathy:     She has no cervical adenopathy. Neurological: She is alert and oriented to person, place, and time. She has normal reflexes. Coordination normal.   Skin: Skin is warm and dry. No rash noted. Psychiatric: She has a normal mood and affect. Thought content normal.   Nursing note and vitals reviewed. Assessment:      Diagnosis Orders   1. HTN (hypertension), benign     2. DDD (degenerative disc disease), lumbar  diclofenac sodium (VOLTAREN) 1 % GEL   3. Arthritis     4. Ankle strain, left, initial encounter  diclofenac sodium (VOLTAREN) 1 % GEL       Plan:        Orders Placed This Encounter   Medications    diclofenac sodium (VOLTAREN) 1 % GEL     Sig: Apply 2 g topically 2 times daily     Dispense:  1 Tube     Refill:  3       Controlled Substances Monitoring:      Return in about 3 months (around 9/28/2018) for Routine follow up, Review of [address]. Bessie MOLINA, am scribing for and in the presence of Tevin Cerrato DO. Electronically signed by : Tevin Jain DO, personally performed the services described in this documentation, as scribed by Bessie Wallace in my presence, and it is both accurate and complete.  Electronically signed by: Tevin Cerrato DO    7/5/18 10:47 PM    Tevin Cerrato DO

## 2018-07-09 DIAGNOSIS — M19.90 ARTHRITIS: ICD-10-CM

## 2018-07-09 RX ORDER — TRAMADOL HYDROCHLORIDE 50 MG/1
TABLET ORAL
Qty: 120 TABLET | Refills: 1 | Status: SHIPPED | OUTPATIENT
Start: 2018-07-09 | End: 2018-08-08

## 2018-08-08 DIAGNOSIS — I10 HTN (HYPERTENSION), BENIGN: ICD-10-CM

## 2018-08-08 RX ORDER — BENAZEPRIL HYDROCHLORIDE 40 MG/1
TABLET, FILM COATED ORAL
Qty: 90 TABLET | Refills: 1 | Status: SHIPPED | OUTPATIENT
Start: 2018-08-08 | End: 2019-02-05 | Stop reason: SDUPTHER

## 2018-08-11 DIAGNOSIS — M51.36 DDD (DEGENERATIVE DISC DISEASE), LUMBAR: ICD-10-CM

## 2018-08-11 DIAGNOSIS — S96.912A ANKLE STRAIN, LEFT, INITIAL ENCOUNTER: ICD-10-CM

## 2018-09-07 ENCOUNTER — PATIENT MESSAGE (OUTPATIENT)
Dept: PRIMARY CARE CLINIC | Age: 58
End: 2018-09-07

## 2018-09-08 DIAGNOSIS — M79.672 FOOT PAIN, LEFT: ICD-10-CM

## 2018-09-08 DIAGNOSIS — M54.50 LUMBAR PAIN WITH RADIATION DOWN LEFT LEG: ICD-10-CM

## 2018-09-08 DIAGNOSIS — M51.36 DDD (DEGENERATIVE DISC DISEASE), LUMBAR: ICD-10-CM

## 2018-09-08 DIAGNOSIS — I10 HTN (HYPERTENSION), BENIGN: ICD-10-CM

## 2018-09-08 DIAGNOSIS — M79.605 LUMBAR PAIN WITH RADIATION DOWN LEFT LEG: ICD-10-CM

## 2018-09-08 RX ORDER — BENAZEPRIL HYDROCHLORIDE 40 MG/1
TABLET, FILM COATED ORAL
Qty: 90 TABLET | Refills: 1 | OUTPATIENT
Start: 2018-09-08

## 2018-09-08 RX ORDER — TRAMADOL HYDROCHLORIDE 50 MG/1
50 TABLET ORAL
COMMUNITY
End: 2018-09-14

## 2018-09-08 NOTE — TELEPHONE ENCOUNTER
From: Graham Smart  Sent: 9/7/2018 3:15 PM EDT  Subject: Medication Renewal Request    Kaydenguzman Berry would like a refill of the following medications:     bisoprolol-hydrochlorothiazide (ZIAC) 5-6.25 MG per tablet Daphnie Dao DO]     methocarbamol (ROBAXIN) 500 MG tablet Daphnie Dao DO]     ibuprofen (ADVIL;MOTRIN) 800 MG tablet Daphnie Dao DO]     benazepril (LOTENSIN) 40 MG tablet Daphnie Dao DO]    Preferred pharmacy: [facility] [place] #84 - [address] -  [phone]  [phone]

## 2018-09-09 RX ORDER — IBUPROFEN 800 MG/1
800 TABLET ORAL 3 TIMES DAILY
Qty: 90 TABLET | Refills: 1 | Status: SHIPPED | OUTPATIENT
Start: 2018-09-09 | End: 2018-09-14

## 2018-09-09 RX ORDER — METHOCARBAMOL 500 MG/1
500-1000 TABLET, FILM COATED ORAL 3 TIMES DAILY
Qty: 90 TABLET | Refills: 2 | Status: SHIPPED | OUTPATIENT
Start: 2018-09-09 | End: 2018-11-26 | Stop reason: SDUPTHER

## 2018-09-09 RX ORDER — BISOPROLOL FUMARATE AND HYDROCHLOROTHIAZIDE 5; 6.25 MG/1; MG/1
1 TABLET ORAL DAILY
Qty: 90 TABLET | Refills: 1 | Status: SHIPPED | OUTPATIENT
Start: 2018-11-09 | End: 2018-09-14

## 2018-09-14 ENCOUNTER — OFFICE VISIT (OUTPATIENT)
Dept: PRIMARY CARE CLINIC | Age: 58
End: 2018-09-14
Payer: COMMERCIAL

## 2018-09-14 VITALS
OXYGEN SATURATION: 99 % | BODY MASS INDEX: 28.91 KG/M2 | HEIGHT: 67 IN | HEART RATE: 64 BPM | DIASTOLIC BLOOD PRESSURE: 92 MMHG | SYSTOLIC BLOOD PRESSURE: 130 MMHG | TEMPERATURE: 98.4 F | RESPIRATION RATE: 14 BRPM

## 2018-09-14 DIAGNOSIS — I10 HTN (HYPERTENSION), BENIGN: Primary | ICD-10-CM

## 2018-09-14 DIAGNOSIS — M16.12 PRIMARY OSTEOARTHRITIS OF LEFT HIP: ICD-10-CM

## 2018-09-14 DIAGNOSIS — M51.36 DDD (DEGENERATIVE DISC DISEASE), LUMBAR: ICD-10-CM

## 2018-09-14 PROCEDURE — 99214 OFFICE O/P EST MOD 30 MIN: CPT | Performed by: FAMILY MEDICINE

## 2018-09-14 RX ORDER — AMLODIPINE BESYLATE 2.5 MG/1
2.5 TABLET ORAL 2 TIMES DAILY
Qty: 60 TABLET | Refills: 3 | Status: SHIPPED | OUTPATIENT
Start: 2018-09-14 | End: 2018-10-11

## 2018-09-14 RX ORDER — HYDROCHLOROTHIAZIDE 25 MG/1
25 TABLET ORAL DAILY
Qty: 90 TABLET | Refills: 1 | Status: SHIPPED | OUTPATIENT
Start: 2018-09-14 | End: 2018-12-13

## 2018-09-14 RX ORDER — IBUPROFEN 600 MG/1
600 TABLET ORAL EVERY 6 HOURS PRN
Qty: 120 TABLET | Refills: 1 | Status: SHIPPED | OUTPATIENT
Start: 2018-09-14 | End: 2018-11-07 | Stop reason: SDUPTHER

## 2018-09-14 RX ORDER — HYDROCHLOROTHIAZIDE 25 MG/1
25 TABLET ORAL DAILY
COMMUNITY
End: 2018-09-14 | Stop reason: SDUPTHER

## 2018-09-14 ASSESSMENT — ENCOUNTER SYMPTOMS
GASTROINTESTINAL NEGATIVE: 1
EYES NEGATIVE: 1
CONSTIPATION: 0
DIARRHEA: 0
EYE ITCHING: 0
SHORTNESS OF BREATH: 0
EYE REDNESS: 0
ABDOMINAL PAIN: 0
BOWEL INCONTINENCE: 0
BLURRED VISION: 0
RESPIRATORY NEGATIVE: 1
BACK PAIN: 1
ORTHOPNEA: 0
PHOTOPHOBIA: 0
WHEEZING: 0

## 2018-09-14 NOTE — PROGRESS NOTES
(!) 130/92 (Site: Left Upper Arm, Position: Sitting, Cuff Size: Medium Adult)   Pulse 64   Temp 98.4 °F (36.9 °C) (Oral)   Resp 14   Ht 5' 7\" (1.702 m)   LMP  (LMP Unknown)   SpO2 99%   BMI 28.91 kg/m²     Physical Exam   Constitutional: She is oriented to person, place, and time. She appears well-developed and well-nourished. HENT:   Head: Normocephalic and atraumatic. Eyes: Pupils are equal, round, and reactive to light. Conjunctivae and EOM are normal.   Neck: Normal range of motion. Neck supple. No thyromegaly present. Cardiovascular: Normal rate, regular rhythm and normal heart sounds. No murmur heard. Pulmonary/Chest: Effort normal and breath sounds normal. She has no wheezes. She exhibits no tenderness. Abdominal: Soft. Bowel sounds are normal. There is no tenderness. Musculoskeletal: Normal range of motion. She exhibits tenderness. She exhibits no edema. Lumbar back: She exhibits tenderness, pain and spasm. Lymphadenopathy:     She has no cervical adenopathy. Neurological: She is alert and oriented to person, place, and time. She has normal reflexes. Coordination normal.   Skin: Skin is warm and dry. No rash noted. Psychiatric: She has a normal mood and affect. Thought content normal.   Nursing note and vitals reviewed. Assessment:      Diagnosis Orders   1. HTN (hypertension), benign  hydrochlorothiazide (HYDRODIURIL) 25 MG tablet    amLODIPine (NORVASC) 2.5 MG tablet   2. DDD (degenerative disc disease), lumbar  ibuprofen (ADVIL;MOTRIN) 600 MG tablet   3.  Primary osteoarthritis of left hip  External Referral To Interventional Radiology       Plan:      Orders Placed This Encounter   Procedures    External Referral To Interventional Radiology     Referral Priority:   Routine     Referral Type:   Eval and Treat     Referral Reason:   Specialty Services Required     Requested Specialty:   Interventional Radiology     Number of Visits Requested:   1     Orders Placed

## 2018-10-11 ENCOUNTER — OFFICE VISIT (OUTPATIENT)
Dept: PRIMARY CARE CLINIC | Age: 58
End: 2018-10-11
Payer: COMMERCIAL

## 2018-10-11 VITALS
HEART RATE: 74 BPM | OXYGEN SATURATION: 96 % | DIASTOLIC BLOOD PRESSURE: 100 MMHG | SYSTOLIC BLOOD PRESSURE: 134 MMHG | TEMPERATURE: 97.7 F | WEIGHT: 184 LBS | BODY MASS INDEX: 28.88 KG/M2 | HEIGHT: 67 IN | RESPIRATION RATE: 16 BRPM

## 2018-10-11 DIAGNOSIS — M19.90 ARTHRITIS: Primary | ICD-10-CM

## 2018-10-11 DIAGNOSIS — I10 HTN (HYPERTENSION), BENIGN: ICD-10-CM

## 2018-10-11 DIAGNOSIS — M67.40 MUCOID CYST OF JOINT: ICD-10-CM

## 2018-10-11 DIAGNOSIS — M51.36 DDD (DEGENERATIVE DISC DISEASE), LUMBAR: ICD-10-CM

## 2018-10-11 DIAGNOSIS — M79.672 LEFT FOOT PAIN: ICD-10-CM

## 2018-10-11 PROCEDURE — 99214 OFFICE O/P EST MOD 30 MIN: CPT | Performed by: FAMILY MEDICINE

## 2018-10-11 RX ORDER — AMLODIPINE BESYLATE 5 MG/1
5 TABLET ORAL DAILY
Qty: 30 TABLET | Refills: 3 | Status: SHIPPED | OUTPATIENT
Start: 2018-10-11 | End: 2019-02-05 | Stop reason: SDUPTHER

## 2018-10-11 ASSESSMENT — ENCOUNTER SYMPTOMS
GASTROINTESTINAL NEGATIVE: 1
WHEEZING: 0
BACK PAIN: 0
CONSTIPATION: 0
RESPIRATORY NEGATIVE: 1
EYE REDNESS: 0
CHANGE IN BOWEL HABIT: 0
EYES NEGATIVE: 1
DIARRHEA: 0
SORE THROAT: 0
ABDOMINAL PAIN: 0
VOMITING: 0
NAUSEA: 0
SHORTNESS OF BREATH: 0
SWOLLEN GLANDS: 0
VISUAL CHANGE: 0
PHOTOPHOBIA: 0
COUGH: 0
EYE ITCHING: 0

## 2018-11-07 DIAGNOSIS — M51.36 DDD (DEGENERATIVE DISC DISEASE), LUMBAR: ICD-10-CM

## 2018-11-07 RX ORDER — IBUPROFEN 600 MG/1
600 TABLET ORAL EVERY 6 HOURS PRN
Qty: 120 TABLET | Refills: 1 | Status: SHIPPED | OUTPATIENT
Start: 2018-11-07 | End: 2019-01-31 | Stop reason: SDUPTHER

## 2018-11-07 RX ORDER — TRAMADOL HYDROCHLORIDE 50 MG/1
TABLET ORAL
Qty: 120 TABLET | Refills: 1 | Status: SHIPPED | OUTPATIENT
Start: 2018-11-07 | End: 2018-11-12 | Stop reason: SDUPTHER

## 2018-11-12 DIAGNOSIS — M51.36 DDD (DEGENERATIVE DISC DISEASE), LUMBAR: ICD-10-CM

## 2018-11-12 RX ORDER — TRAMADOL HYDROCHLORIDE 50 MG/1
50-100 TABLET ORAL EVERY 6 HOURS PRN
Qty: 120 TABLET | Refills: 1 | Status: SHIPPED | OUTPATIENT
Start: 2018-11-12 | End: 2019-01-08 | Stop reason: SDUPTHER

## 2018-11-12 NOTE — TELEPHONE ENCOUNTER
From: Camilla Suero  Sent: 11/12/2018 10:13 AM EST  Subject: Medication Renewal Request    Camilla Suero would like a refill of the following medications:     traMADol (ULTRAM) 50 MG tablet Tiny Alejandra DO]   Patient Comment: need refill    Preferred pharmacy: [facility] [place] #84 - [address]  [phone] - F [phone]

## 2018-11-26 DIAGNOSIS — M79.672 FOOT PAIN, LEFT: ICD-10-CM

## 2018-11-26 RX ORDER — LIDOCAINE 50 MG/G
1 PATCH TOPICAL DAILY
Qty: 30 PATCH | Refills: 0 | Status: SHIPPED | OUTPATIENT
Start: 2018-11-26

## 2018-11-26 RX ORDER — METHOCARBAMOL 500 MG/1
500-1000 TABLET, FILM COATED ORAL 3 TIMES DAILY
Qty: 90 TABLET | Refills: 2 | Status: SHIPPED | OUTPATIENT
Start: 2018-11-26 | End: 2019-03-19 | Stop reason: SDUPTHER

## 2018-12-13 ENCOUNTER — OFFICE VISIT (OUTPATIENT)
Dept: PRIMARY CARE CLINIC | Age: 58
End: 2018-12-13
Payer: COMMERCIAL

## 2018-12-13 VITALS
BODY MASS INDEX: 29.19 KG/M2 | WEIGHT: 186 LBS | HEIGHT: 67 IN | TEMPERATURE: 98.2 F | RESPIRATION RATE: 14 BRPM | SYSTOLIC BLOOD PRESSURE: 110 MMHG | HEART RATE: 64 BPM | DIASTOLIC BLOOD PRESSURE: 80 MMHG | OXYGEN SATURATION: 97 %

## 2018-12-13 DIAGNOSIS — M51.36 DDD (DEGENERATIVE DISC DISEASE), LUMBAR: ICD-10-CM

## 2018-12-13 DIAGNOSIS — R06.83 SNORING: ICD-10-CM

## 2018-12-13 DIAGNOSIS — M16.12 PRIMARY OSTEOARTHRITIS OF LEFT HIP: Primary | ICD-10-CM

## 2018-12-13 DIAGNOSIS — I10 HTN (HYPERTENSION), BENIGN: ICD-10-CM

## 2018-12-13 PROCEDURE — 99214 OFFICE O/P EST MOD 30 MIN: CPT | Performed by: FAMILY MEDICINE

## 2018-12-13 ASSESSMENT — ENCOUNTER SYMPTOMS
PHOTOPHOBIA: 0
ABDOMINAL PAIN: 0
DIARRHEA: 0
SHORTNESS OF BREATH: 0
EYES NEGATIVE: 1
EYE ITCHING: 0
RESPIRATORY NEGATIVE: 1
CONSTIPATION: 0
WHEEZING: 0
BACK PAIN: 0
GASTROINTESTINAL NEGATIVE: 1
EYE REDNESS: 0
BLURRED VISION: 0
ORTHOPNEA: 0

## 2019-01-08 DIAGNOSIS — M51.36 DDD (DEGENERATIVE DISC DISEASE), LUMBAR: ICD-10-CM

## 2019-01-08 DIAGNOSIS — S96.912A ANKLE STRAIN, LEFT, INITIAL ENCOUNTER: ICD-10-CM

## 2019-01-09 RX ORDER — TRAMADOL HYDROCHLORIDE 50 MG/1
50-100 TABLET ORAL EVERY 6 HOURS PRN
Qty: 120 TABLET | Refills: 1 | Status: SHIPPED | OUTPATIENT
Start: 2019-01-09 | End: 2019-02-08

## 2019-01-31 DIAGNOSIS — M51.36 DDD (DEGENERATIVE DISC DISEASE), LUMBAR: ICD-10-CM

## 2019-01-31 RX ORDER — IBUPROFEN 600 MG/1
600 TABLET ORAL EVERY 6 HOURS PRN
Qty: 120 TABLET | Refills: 1 | Status: SHIPPED | OUTPATIENT
Start: 2019-01-31

## 2019-02-05 DIAGNOSIS — I10 HTN (HYPERTENSION), BENIGN: ICD-10-CM

## 2019-02-05 RX ORDER — AMLODIPINE BESYLATE 5 MG/1
5 TABLET ORAL DAILY
Qty: 30 TABLET | Refills: 5 | Status: SHIPPED | OUTPATIENT
Start: 2019-02-05

## 2019-02-05 RX ORDER — BENAZEPRIL HYDROCHLORIDE 40 MG/1
TABLET, FILM COATED ORAL
Qty: 90 TABLET | Refills: 1 | Status: SHIPPED | OUTPATIENT
Start: 2019-02-05

## 2019-03-07 ENCOUNTER — TELEPHONE (OUTPATIENT)
Dept: ADMINISTRATIVE | Age: 59
End: 2019-03-07

## 2019-03-08 RX ORDER — TRAMADOL HYDROCHLORIDE 50 MG/1
TABLET ORAL
Qty: 120 TABLET | OUTPATIENT
Start: 2019-03-08

## 2019-03-13 ENCOUNTER — OFFICE VISIT (OUTPATIENT)
Dept: PRIMARY CARE CLINIC | Age: 59
End: 2019-03-13
Payer: COMMERCIAL

## 2019-03-13 VITALS
RESPIRATION RATE: 14 BRPM | WEIGHT: 187 LBS | DIASTOLIC BLOOD PRESSURE: 80 MMHG | HEIGHT: 67 IN | HEART RATE: 73 BPM | OXYGEN SATURATION: 98 % | SYSTOLIC BLOOD PRESSURE: 120 MMHG | TEMPERATURE: 98.2 F | BODY MASS INDEX: 29.35 KG/M2

## 2019-03-13 DIAGNOSIS — M51.36 DDD (DEGENERATIVE DISC DISEASE), LUMBAR: ICD-10-CM

## 2019-03-13 DIAGNOSIS — I10 ESSENTIAL HYPERTENSION: Primary | ICD-10-CM

## 2019-03-13 DIAGNOSIS — M19.90 ARTHRITIS: ICD-10-CM

## 2019-03-13 DIAGNOSIS — G89.4 CHRONIC PAIN SYNDROME: ICD-10-CM

## 2019-03-13 PROCEDURE — 99214 OFFICE O/P EST MOD 30 MIN: CPT | Performed by: FAMILY MEDICINE

## 2019-03-13 RX ORDER — TRAMADOL HYDROCHLORIDE 50 MG/1
50 TABLET ORAL EVERY 6 HOURS PRN
Qty: 120 TABLET | Refills: 0 | Status: SHIPPED | OUTPATIENT
Start: 2019-03-13 | End: 2019-04-12

## 2019-03-13 ASSESSMENT — PATIENT HEALTH QUESTIONNAIRE - PHQ9
SUM OF ALL RESPONSES TO PHQ9 QUESTIONS 1 & 2: 0
2. FEELING DOWN, DEPRESSED OR HOPELESS: 0
SUM OF ALL RESPONSES TO PHQ QUESTIONS 1-9: 0
1. LITTLE INTEREST OR PLEASURE IN DOING THINGS: 0
SUM OF ALL RESPONSES TO PHQ QUESTIONS 1-9: 0

## 2019-03-13 ASSESSMENT — ENCOUNTER SYMPTOMS
PHOTOPHOBIA: 0
ABDOMINAL PAIN: 0
EYE REDNESS: 0
EYE ITCHING: 0
ORTHOPNEA: 0
RESPIRATORY NEGATIVE: 1
BACK PAIN: 0
WHEEZING: 0
CONSTIPATION: 0
EYES NEGATIVE: 1
GASTROINTESTINAL NEGATIVE: 1
BLURRED VISION: 0
SHORTNESS OF BREATH: 0
DIARRHEA: 0

## 2019-03-19 DIAGNOSIS — M79.672 FOOT PAIN, LEFT: ICD-10-CM

## 2019-03-21 RX ORDER — METHOCARBAMOL 500 MG/1
500-1000 TABLET, FILM COATED ORAL 3 TIMES DAILY
Qty: 90 TABLET | Refills: 2 | Status: SHIPPED | OUTPATIENT
Start: 2019-03-21

## 2023-03-08 DIAGNOSIS — I10 ESSENTIAL (PRIMARY) HYPERTENSION: ICD-10-CM

## 2023-03-08 RX ORDER — AMLODIPINE BESYLATE 5 MG/1
TABLET ORAL
Qty: 90 TABLET | Refills: 1 | Status: SHIPPED | OUTPATIENT
Start: 2023-03-08 | End: 2023-09-19

## 2023-05-29 DIAGNOSIS — I10 ESSENTIAL (PRIMARY) HYPERTENSION: ICD-10-CM

## 2023-05-30 RX ORDER — BENAZEPRIL HYDROCHLORIDE 40 MG/1
TABLET ORAL
Qty: 30 TABLET | Refills: 5 | Status: SHIPPED | OUTPATIENT
Start: 2023-05-30 | End: 2023-11-27 | Stop reason: SDUPTHER

## 2023-07-19 DIAGNOSIS — I10 ESSENTIAL (PRIMARY) HYPERTENSION: ICD-10-CM

## 2023-07-20 RX ORDER — BISOPROLOL FUMARATE AND HYDROCHLOROTHIAZIDE 5; 6.25 MG/1; MG/1
TABLET ORAL
Qty: 90 TABLET | Refills: 0 | Status: SHIPPED | OUTPATIENT
Start: 2023-07-20 | End: 2023-11-07 | Stop reason: SDUPTHER

## 2023-09-01 ENCOUNTER — HOSPITAL ENCOUNTER (OUTPATIENT)
Dept: DATA CONVERSION | Facility: HOSPITAL | Age: 63
End: 2023-09-01
Attending: OBSTETRICS & GYNECOLOGY | Admitting: OBSTETRICS & GYNECOLOGY
Payer: COMMERCIAL

## 2023-09-01 DIAGNOSIS — R87.613 HIGH GRADE SQUAMOUS INTRAEPITHELIAL LESION ON CYTOLOGIC SMEAR OF CERVIX (HGSIL): ICD-10-CM

## 2023-09-01 DIAGNOSIS — N83.291 OTHER OVARIAN CYST, RIGHT SIDE: ICD-10-CM

## 2023-09-01 DIAGNOSIS — N83.292 OTHER OVARIAN CYST, LEFT SIDE: ICD-10-CM

## 2023-09-01 DIAGNOSIS — D25.9 LEIOMYOMA OF UTERUS, UNSPECIFIED: ICD-10-CM

## 2023-09-01 DIAGNOSIS — D06.9 CARCINOMA IN SITU OF CERVIX, UNSPECIFIED: ICD-10-CM

## 2023-09-01 LAB
ABO GROUP (TYPE) IN BLOOD: NORMAL
RH FACTOR: NORMAL

## 2023-09-18 DIAGNOSIS — I10 ESSENTIAL (PRIMARY) HYPERTENSION: ICD-10-CM

## 2023-09-19 RX ORDER — AMLODIPINE BESYLATE 5 MG/1
TABLET ORAL
Qty: 90 TABLET | Refills: 0 | Status: SHIPPED | OUTPATIENT
Start: 2023-09-19 | End: 2023-10-30 | Stop reason: SDUPTHER

## 2023-09-22 PROBLEM — M54.50 LUMBAGO: Status: ACTIVE | Noted: 2023-09-22

## 2023-09-22 PROBLEM — R87.614: Status: ACTIVE | Noted: 2023-09-22

## 2023-09-22 PROBLEM — M16.11 OSTEOARTHRITIS OF RIGHT HIP: Status: ACTIVE | Noted: 2023-09-22

## 2023-09-22 PROBLEM — E53.8 VITAMIN B12 DEFICIENCY: Status: ACTIVE | Noted: 2023-09-22

## 2023-09-22 PROBLEM — M65.4 DE QUERVAIN'S DISEASE (TENOSYNOVITIS): Status: ACTIVE | Noted: 2023-09-22

## 2023-09-22 PROBLEM — D06.9 HIGH GRADE SQUAMOUS INTRAEPITHELIAL LESION (HGSIL), GRADE 3 CIN, ON BIOPSY OF CERVIX: Status: ACTIVE | Noted: 2023-09-22

## 2023-09-22 PROBLEM — E66.3 OVERWEIGHT (BMI 25.0-29.9): Status: ACTIVE | Noted: 2023-09-22

## 2023-09-22 PROBLEM — M79.672 LEFT FOOT PAIN: Status: ACTIVE | Noted: 2023-09-22

## 2023-09-22 PROBLEM — R51.9 HEADACHE: Status: ACTIVE | Noted: 2023-09-22

## 2023-09-22 PROBLEM — E55.9 VITAMIN D DEFICIENCY: Status: ACTIVE | Noted: 2023-09-22

## 2023-09-22 PROBLEM — R92.8 ABNORMAL FINDING ON MAMMOGRAPHY: Status: ACTIVE | Noted: 2023-09-22

## 2023-09-22 PROBLEM — K63.5 COLON POLYPS: Status: ACTIVE | Noted: 2023-09-22

## 2023-09-22 PROBLEM — R87.810 CERVICAL HIGH RISK HPV (HUMAN PAPILLOMAVIRUS) TEST POSITIVE: Status: ACTIVE | Noted: 2023-09-22

## 2023-09-22 PROBLEM — M54.16 LUMBAR RADICULOPATHY: Status: ACTIVE | Noted: 2023-09-22

## 2023-09-22 PROBLEM — N88.1: Status: ACTIVE | Noted: 2023-09-22

## 2023-09-22 PROBLEM — M25.531 RIGHT WRIST PAIN: Status: ACTIVE | Noted: 2023-09-22

## 2023-09-22 PROBLEM — R42 VERTIGO: Status: ACTIVE | Noted: 2023-09-22

## 2023-09-22 PROBLEM — M25.559 ARTHRALGIA OF HIP: Status: ACTIVE | Noted: 2023-09-22

## 2023-09-22 PROBLEM — M25.559 JOINT PAIN, HIP: Status: ACTIVE | Noted: 2023-09-22

## 2023-09-22 PROBLEM — S39.012A STRAIN OF LUMBAR REGION: Status: ACTIVE | Noted: 2023-09-22

## 2023-09-22 PROBLEM — I10 BENIGN ESSENTIAL HYPERTENSION: Status: ACTIVE | Noted: 2023-09-22

## 2023-09-22 PROBLEM — M47.816 LUMBAR SPONDYLOSIS: Status: ACTIVE | Noted: 2023-09-22

## 2023-09-22 RX ORDER — METHOCARBAMOL 500 MG/1
1-2 TABLET, FILM COATED ORAL NIGHTLY
COMMUNITY
Start: 2017-09-18

## 2023-09-22 RX ORDER — DOCUSATE SODIUM 100 MG/1
100 CAPSULE, LIQUID FILLED ORAL 2 TIMES DAILY
COMMUNITY
Start: 2023-09-01

## 2023-09-22 RX ORDER — TRAMADOL HYDROCHLORIDE 50 MG/1
50 TABLET ORAL 3 TIMES DAILY PRN
COMMUNITY
Start: 2022-08-26 | End: 2023-12-13 | Stop reason: SDUPTHER

## 2023-09-22 RX ORDER — ASPIRIN 81 MG/1
81 TABLET ORAL 2 TIMES DAILY
COMMUNITY
Start: 2020-05-08

## 2023-09-22 RX ORDER — NALOXONE HYDROCHLORIDE 4 MG/.1ML
SPRAY NASAL AS NEEDED
COMMUNITY
Start: 2020-03-26

## 2023-09-22 RX ORDER — MECLIZINE HCL 12.5 MG 12.5 MG/1
12.5 TABLET ORAL 3 TIMES DAILY
COMMUNITY
Start: 2022-10-24

## 2023-09-22 RX ORDER — DICLOFENAC SODIUM 10 MG/G
2 GEL TOPICAL 2 TIMES DAILY
COMMUNITY
Start: 2021-01-26

## 2023-09-22 RX ORDER — GABAPENTIN 600 MG/1
600 TABLET ORAL 3 TIMES DAILY
COMMUNITY
Start: 2023-09-01

## 2023-09-22 RX ORDER — PNV NO.95/FERROUS FUM/FOLIC AC 28MG-0.8MG
1 TABLET ORAL DAILY
COMMUNITY
Start: 2020-02-04

## 2023-09-22 RX ORDER — ACETAMINOPHEN 500 MG
1000 TABLET ORAL 3 TIMES DAILY
COMMUNITY
Start: 2023-09-01

## 2023-09-29 VITALS
TEMPERATURE: 97.3 F | HEIGHT: 68 IN | RESPIRATION RATE: 16 BRPM | WEIGHT: 176.81 LBS | DIASTOLIC BLOOD PRESSURE: 91 MMHG | SYSTOLIC BLOOD PRESSURE: 134 MMHG | BODY MASS INDEX: 26.8 KG/M2 | HEART RATE: 66 BPM

## 2023-09-30 NOTE — H&P
History of Present Illness:   History Present Illness:  Reason for surgery: CIN3, Hx of LEEP, inadequate  remaining cervical tissue to perform diagnostic LEEP or cone   HPI:    CIN3, Hx of LEEP, inadequate remaining cervical tissue to perform diagnostic LEEP or cone    Allergies:        Allergies:  ·  No Known Allergies :     Home Medication Review:   Home Medications Reviewed: yes     Impression/Procedure:   ·  Impression and Planned Procedure: total laparoscopic hysterectomy with bilateral salpingo-oophorectomy and cystoscopy for CIN3, Hx of LEEP, inadequate remaining cervical tissue to perform diagnostic  LEEP or cone       ERAS (Enhanced Recovery After Surgery):  ·  ERAS Patient: yes   ·  CPM/PAT Utilization: yes   ·  Immunonutrition Recovery Drink Utilization: yes   ·  Carbohydrate Supplement Drink Utilization: yes       Vital Signs:  Temperature C: 36.3 degrees C   Temperature F: 97.3 degrees F   Heart Rate: 66 beats per minute   Respiratory Rate: 16 breath per minute   Blood Pressure Systolic: 134 mm/Hg   Blood Pressure Diastolic: 91 mm/Hg     Physical Exam by System:    Constitutional: Well developed, awake/alert/oriented  x3, no distress, alert and cooperative   Eyes: PERRL, EOMI, clear sclera   ENMT: mucous membranes moist, no apparent injury,  no lesions seen   Head/Neck: Neck supple, no apparent injury, thyroid  without mass or tenderness, No JVD, trachea midline, no bruits   Respiratory/Thorax: Patent airways, CTAB, normal  breath sounds with good chest expansion, thorax symmetric   Cardiovascular: Regular, rate and rhythm, no murmurs,  2+ equal pulses of the extremities, normal S 1and S 2   Gastrointestinal: Nondistended, soft, non-tender,  no rebound tenderness or guarding, no masses palpable, no organomegaly, +BS, no bruits   Genitourinary: No Discharge, vesicles or other abnormalities   Musculoskeletal: ROM intact, no joint swelling, normal  strength   Extremities: normal extremities, no  cyanosis edema,  contusions or wounds, no clubbing   Neurological: alert and oriented x3, intact senses,  motor, response and reflexes, normal strength   Lymphatic: No significant lymphadenopathy   Psychological: Appropriate mood and behavior   Skin: Warm and dry, no lesions, no rashes     Consent:   COVID-19 Consent:  ·  COVID-19 Risk Consent Surgeon has reviewed key risks related to the risk of laurence COVID-19 and if they contract COVID-19 what the risks are.       Electronic Signatures:  Ivan Manrique)  (Signed 01-Sep-2023 10:46)   Authored: History of Present Illness, Allergies, Home  Medication Review, Impression/Procedure, ERAS, Physical Exam, Consent, Note Completion      Last Updated: 01-Sep-2023 10:46 by Ivan Manrique ()

## 2023-10-01 NOTE — OP NOTE
PROCEDURE DETAILS    Preoperative Diagnosis:  Recurrent HGSIL/CIN3  History of LEEP  Inadequate remaining cervix for repeat LEEP  Postoperative Diagnosis:  Recurrent HGSIL/CIN3  History of LEEP  Inadequate remaining cervix for repeat LEEP  Surgeon: Ivan Manrique  Resident/Fellow/Other Assistant: Brian Henderson    Procedure:  1. TOTAL LAPAROSCOPIC HYSTERECTOMY, BILATERAL SALPINGO-OOPHORECTOMY, CYSTOSCOPY    Anesthesia: No anesthesiologist associated with this case  Estimated Blood Loss: 50 mL   Findings: Uterus with grossly normal cervix, normal bilateral fallopian tubes and ovaries, normal appendix  Specimens(s) Collected: yes,  Uterus with cervix and bilateral fallopian tubes and ovaries   Complications: none        Operative Report:   DETAILS:   Patient presents for definitive hysterectomy for recurrent HGSIL/CIN3 after recently LEEP with inadequate remaining cervix left to perform any additional  excisional diagnostic procedures.  Patient was seen after the colposcopy by Dr. Coyle from GYN/ONC and she agreed that a repeat excisional procedure was not possible due to minimal remaining cervical tissue and recommended simple hysterectomy with  removal of the adnexa.  The patient was very adamant that she did not want to have surgery done at [facility] and requested at the [facility].  Dr. Coyle agreed that it was reasonable to perform the procedure with myself at this hospital  on planned GYN/ONC co-surgery day with the assistance of Dr. Coyle.  She was also counseled by Dr. Coyle preoperatively that if the final pathology would show cervical cancer then she would need postop therapy likely with radiation therapy.      PROCEDURE:  After informed consent the patient was brought to the operating room. The patient was intubated without complication after general anesthesia was started. She is placed in lithotomy position in yellowfin stirrups. An exam under anesthesia  was performed. She  was prepped draped in the normal sterile fashion with ChloraPrep and Betadine. Timeout was performed. She was given preoperative antibiotics. Gomez was introduced into the bladder sterilely with with production of clear urine. A speculum  was placed in the vagina and anterior lip of the cervix grasped with a single-tooth tenaculum. The uterus was sounded and dilated and a YAKELIN uterine manipulator was placed in the uterine cavity. Attention was then paid to the abdomen.    Marcaine was injected at the umbilicus and a scalpel incision was made midline to 15 mm down to the underlying fascia which was opened sharply, the peritoneal cavity was opened bluntly. A single site gel port was placed. The peritoneum was insufflated.   A second 5 mm disposable trocar was also placed in the LLQ under direct laparoscopic visualization.   The abdomen was surveyed.  Attention was turned to the salping-oophorectomy portion of the procedure, the IP ligament was sealed and transected and  then the broad ligament until the adnexa were amputated, they were lef were left still attached to the uterus with removal planned vaginally with the hysterectomy specimen. The round ligament to the utero-ovarian ligament broad ligament and uterine artery  were sealed and divided away bilaterally. The bladder was identified.  Bladder flap was created. The colpotomy was then performed with the monopolar tip anteriorly and came around circumferentially.   Colpotomy was able to be completed and the uterus  with cervix and the bilateral tubes were removed through the vagina.   Dr. Coyle's exam the patient preoperatively and also the specimen after removal and found no evidence of gross cervical cancer.  Pneumo-occluder was then placed in the vagina.   Suction and irrigation were performed to allow to the edges of the vagina which was then closed laparoscopically in a horizontal running fashion using the V-lock suture.  Tisseal was placed on the cuff  laparoscopically.  Hemostasis was noted and all  surfaces were well visualized, all irrigation fluid was removed from the peritoneum. The diaphragm was irrigated with a dilute lidocaine and bicarb solution and left in place. The laparoscopic ports were then removed and the incisions were hemostatic.   The rectus fascia at the umbilicus was then closed. Subcutaneous tissue was irrigated.  The skin was then closed and then on the 2 ports skin glue was placed over all of the incisions. The pneumo-occluder was removed from the vagina, vaginoscopy done,  blood clots were then removed excellent cuff closure was noted.    The hull catheter was then removed.  Cystoscopy was then performed identifying ureteral orifices bilaterally and no intrusion or distortion upon  the bladder. Bilateral reflux from the ureter orifices was noted. Procedure was then ended and all counts are reported as correct x 2. The patient was transferred to PACU in stable condition.                        Attestation:   Note Completion:  Attending Attestation I performed the procedure without a resident         Electronic Signatures:  Ivan Manrique)  (Signed 01-Sep-2023 12:50)   Authored: Post-Operative Note, Chart Review, Note Completion      Last Updated: 01-Sep-2023 12:50 by Ivan Manrique ()

## 2023-10-10 LAB
COMPLETE PATHOLOGY REPORT: NORMAL
CONVERTED ADDENDUM DIAGNOSIS 2: NORMAL
CONVERTED CLINICAL DIAGNOSIS-HISTORY: NORMAL
CONVERTED FINAL DIAGNOSIS: NORMAL
CONVERTED FINAL REPORT PDF LINK TO COPY AND PASTE: NORMAL
CONVERTED GROSS DESCRIPTION: NORMAL

## 2023-10-13 ENCOUNTER — TELEPHONE (OUTPATIENT)
Dept: OBSTETRICS AND GYNECOLOGY | Facility: CLINIC | Age: 63
End: 2023-10-13
Payer: COMMERCIAL

## 2023-10-13 DIAGNOSIS — R71.8 ABNORMALITY, RED BLOOD CELLS: Primary | ICD-10-CM

## 2023-10-13 NOTE — TELEPHONE ENCOUNTER
Called patient with path results from hysterectomy.  Patient did not answer, had to leave detailed voicemail and will also send [employer] message.      -No evidence of cervical cancer, HGSIL noted with multifocal positive ectocervical margins.  Dr. Coyle from GYN/ONC consulted about these results and does not have any significant concerns about the positive margins and she only recommends a repeat Pap and HPV testing in 6-12 months.      -Positive nucleated RBCs and RBC precursors in cervical stroma, all PATH stains negative, HEM/PATH recommends peripheral smear to make sure no abnormalities in peripheral RBCs.  CBC with DIFF ordered--spoke with [facility] hem lab personnel and this was the specific order they informed me to order to have this recommended testing done.      Ivan Manrique, DO

## 2023-10-19 ENCOUNTER — LAB (OUTPATIENT)
Dept: LAB | Facility: LAB | Age: 63
End: 2023-10-19
Payer: COMMERCIAL

## 2023-10-19 DIAGNOSIS — R71.8 ABNORMALITY, RED BLOOD CELLS: ICD-10-CM

## 2023-10-19 LAB
BASOPHILS # BLD AUTO: 0.03 X10*3/UL (ref 0–0.1)
BASOPHILS NFR BLD AUTO: 0.5 %
EOSINOPHIL # BLD AUTO: 0.2 X10*3/UL (ref 0–0.7)
EOSINOPHIL NFR BLD AUTO: 3.2 %
ERYTHROCYTE [DISTWIDTH] IN BLOOD BY AUTOMATED COUNT: 12.6 % (ref 11.5–14.5)
HCT VFR BLD AUTO: 42.7 % (ref 36–46)
HGB BLD-MCNC: 14.5 G/DL (ref 12–16)
IMM GRANULOCYTES # BLD AUTO: 0.02 X10*3/UL (ref 0–0.7)
IMM GRANULOCYTES NFR BLD AUTO: 0.3 % (ref 0–0.9)
LYMPHOCYTES # BLD AUTO: 2.39 X10*3/UL (ref 1.2–4.8)
LYMPHOCYTES NFR BLD AUTO: 38.1 %
MCH RBC QN AUTO: 30.1 PG (ref 26–34)
MCHC RBC AUTO-ENTMCNC: 34 G/DL (ref 32–36)
MCV RBC AUTO: 89 FL (ref 80–100)
MONOCYTES # BLD AUTO: 0.58 X10*3/UL (ref 0.1–1)
MONOCYTES NFR BLD AUTO: 9.2 %
NEUTROPHILS # BLD AUTO: 3.06 X10*3/UL (ref 1.2–7.7)
NEUTROPHILS NFR BLD AUTO: 48.7 %
NRBC BLD-RTO: 0 /100 WBCS (ref 0–0)
PLATELET # BLD AUTO: 292 X10*3/UL (ref 150–450)
PMV BLD AUTO: 9.5 FL (ref 7.5–11.5)
RBC # BLD AUTO: 4.81 X10*6/UL (ref 4–5.2)
WBC # BLD AUTO: 6.3 X10*3/UL (ref 4.4–11.3)

## 2023-10-19 PROCEDURE — 85025 COMPLETE CBC W/AUTO DIFF WBC: CPT

## 2023-10-19 PROCEDURE — 36415 COLL VENOUS BLD VENIPUNCTURE: CPT

## 2023-10-23 ENCOUNTER — APPOINTMENT (OUTPATIENT)
Dept: OBSTETRICS AND GYNECOLOGY | Facility: CLINIC | Age: 63
End: 2023-10-23
Payer: COMMERCIAL

## 2023-10-30 DIAGNOSIS — I10 ESSENTIAL (PRIMARY) HYPERTENSION: ICD-10-CM

## 2023-10-30 RX ORDER — AMLODIPINE BESYLATE 5 MG/1
5 TABLET ORAL DAILY
Qty: 30 TABLET | Refills: 0 | Status: SHIPPED | OUTPATIENT
Start: 2023-10-30 | End: 2023-11-27 | Stop reason: SDUPTHER

## 2023-10-30 NOTE — TELEPHONE ENCOUNTER
Pt requests refill of:   amLODIPine(Norvasc) 5mg tab    1 tab daily  [facility]   [facility]

## 2023-10-31 ENCOUNTER — OFFICE VISIT (OUTPATIENT)
Dept: OBSTETRICS AND GYNECOLOGY | Facility: CLINIC | Age: 63
End: 2023-10-31
Payer: COMMERCIAL

## 2023-10-31 ENCOUNTER — APPOINTMENT (OUTPATIENT)
Dept: PRIMARY CARE | Facility: CLINIC | Age: 63
End: 2023-10-31
Payer: COMMERCIAL

## 2023-10-31 VITALS
HEIGHT: 68 IN | SYSTOLIC BLOOD PRESSURE: 120 MMHG | BODY MASS INDEX: 27.2 KG/M2 | WEIGHT: 179.5 LBS | DIASTOLIC BLOOD PRESSURE: 80 MMHG

## 2023-10-31 DIAGNOSIS — N95.2 ATROPHIC VULVOVAGINITIS: ICD-10-CM

## 2023-10-31 DIAGNOSIS — Z09 POSTOP CHECK: Primary | ICD-10-CM

## 2023-10-31 DIAGNOSIS — N89.8 GRANULATION TISSUE OF VAGINAL CUFF: ICD-10-CM

## 2023-10-31 DIAGNOSIS — D06.9 HIGH GRADE SQUAMOUS INTRAEPITHELIAL LESION (HGSIL), GRADE 3 CIN, ON BIOPSY OF CERVIX: ICD-10-CM

## 2023-10-31 PROBLEM — R87.810 CERVICAL HIGH RISK HPV (HUMAN PAPILLOMAVIRUS) TEST POSITIVE: Status: RESOLVED | Noted: 2023-09-22 | Resolved: 2023-10-31

## 2023-10-31 PROBLEM — R87.614: Status: RESOLVED | Noted: 2023-09-22 | Resolved: 2023-10-31

## 2023-10-31 PROBLEM — N88.1: Status: RESOLVED | Noted: 2023-09-22 | Resolved: 2023-10-31

## 2023-10-31 PROCEDURE — 3079F DIAST BP 80-89 MM HG: CPT | Performed by: OBSTETRICS & GYNECOLOGY

## 2023-10-31 PROCEDURE — 3074F SYST BP LT 130 MM HG: CPT | Performed by: OBSTETRICS & GYNECOLOGY

## 2023-10-31 PROCEDURE — 99024 POSTOP FOLLOW-UP VISIT: CPT | Performed by: OBSTETRICS & GYNECOLOGY

## 2023-10-31 RX ORDER — ESTRADIOL 0.1 MG/G
CREAM VAGINAL
Qty: 42 G | Refills: 3 | Status: SHIPPED | OUTPATIENT
Start: 2023-10-31

## 2023-10-31 NOTE — PROGRESS NOTES
"GYNECOLOGY PROGRESS NOTE        CC:     Chief Complaint   Patient presents with    Post-op     Est patient - Post OP 1 month cuff check -had intercourse on Sat had some light bleeding and then it went away and nothing since.  Chaperone trevor burgess ma        HPI:  Portia Vargas is here for 6-week postop hysterectomy cuff check.  Had some spotting (no pain) after sex 1 week ago, but no other postop issues and is doing well.  Normal voids and BMs, no VB.  No new GYN complaints.        ROS:  GYN - no AUB or pain  GI - normal BMs  URO - normal voids      PHYSICAL EXAM:  /80 (BP Location: Left arm, Patient Position: Sitting)   Ht 1.727 m (5' 8\")   Wt 81.4 kg (179 lb 8 oz)   BMI 27.29 kg/m²   GEN:  A&O, NAD  ABD  NT/ND, soft, no palpable masses  INCISION:  port sites well healed, no separation or erythema or discharge from wound  PELVIC:  normal vulva and vagina, vaginal cuff well-healed with mobile 1 cm x 4 mm piece of granulation tissue present--over nitrate application done and good hemostasis and shrinkage of granulation tissue noted, no active bleeding noted and no separation or discharge of cuff noted  PSYCH:  normal affect, non-anxious        IMPRESSION/PLAN:    Problem List Items Addressed This Visit       High grade squamous intraepithelial lesion (HGSIL), grade 3 JANI, on biopsy of cervix    Atrophic vulvovaginitis    Relevant Medications    estradiol (Estrace) 0.01 % (0.1 mg/gram) vaginal cream     Other Visit Diagnoses       Postop check    -  Primary    Granulation tissue of vaginal cuff                Doing well postoperatively, released from postoperative care but continue pelvic rest for 2 more weeks.     Postop PCB/spotting:  Due to granulation tissue at vaginal cuff, treated today with silver nitrate with good vision improvement.  Will start topical estrogen Rx to hasten vaginal cuff healing and to improve vulvovaginal atrophy, Rx provided for estradiol cream for vaginal use for 2 weeks and " then perineal use thereafter for maintenance.    Intraoperative events and findings reviewed with patient previously.  Final pathology results-HGSIL/CIN3 with + ectocervical margins and abnormal # of RBC precursors and cervical stroma--reviewed with patient.  Recommendation per HEM/PATH was to check a peripheral blood smear, which was completed 2 weeks ago and was normal.  Her Coyle from GYN/ONC is aware of the positive margins and recommendation is just repeat Pap and HPV testing in 6-12 months with vaginal COLPO if HGSIL on any F/U paps.    Hx of HGSIL with LEEP and TLH--DOES have a need for future pap smear screenings for 25 years after initial HGSIL diagnosis (in 2022).    F/U 6 months for repeat pap/HPV testing.      Ivan Manrique, DO

## 2023-11-02 ENCOUNTER — TELEPHONE (OUTPATIENT)
Dept: PRIMARY CARE | Facility: CLINIC | Age: 63
End: 2023-11-02
Payer: COMMERCIAL

## 2023-11-02 DIAGNOSIS — I10 ESSENTIAL (PRIMARY) HYPERTENSION: ICD-10-CM

## 2023-11-02 NOTE — TELEPHONE ENCOUNTER
PT CHRISTIANO SUAREZ     PT CALLED  IN FOR MED REFILL    BISOPROLOL 5-6.25MG     [facility]#84

## 2023-11-07 DIAGNOSIS — I10 ESSENTIAL (PRIMARY) HYPERTENSION: ICD-10-CM

## 2023-11-07 NOTE — TELEPHONE ENCOUNTER
Dr. Maldonado pt   Refill on   bisoproloL-hydrochlorothiazide (Ziac) 5-6.25     [facility] - [place], [place] - [address] [facility]

## 2023-11-08 RX ORDER — BISOPROLOL FUMARATE AND HYDROCHLOROTHIAZIDE 5; 6.25 MG/1; MG/1
1 TABLET ORAL DAILY
Qty: 30 TABLET | Refills: 0 | Status: SHIPPED | OUTPATIENT
Start: 2023-11-08 | End: 2023-11-27 | Stop reason: SDUPTHER

## 2023-11-26 DIAGNOSIS — I10 ESSENTIAL (PRIMARY) HYPERTENSION: ICD-10-CM

## 2023-11-27 ENCOUNTER — OFFICE VISIT (OUTPATIENT)
Dept: PRIMARY CARE | Facility: CLINIC | Age: 63
End: 2023-11-27
Payer: COMMERCIAL

## 2023-11-27 VITALS
TEMPERATURE: 98.1 F | RESPIRATION RATE: 15 BRPM | BODY MASS INDEX: 27.55 KG/M2 | SYSTOLIC BLOOD PRESSURE: 120 MMHG | OXYGEN SATURATION: 98 % | HEIGHT: 68 IN | WEIGHT: 181.8 LBS | DIASTOLIC BLOOD PRESSURE: 86 MMHG | HEART RATE: 65 BPM

## 2023-11-27 DIAGNOSIS — I10 BENIGN ESSENTIAL HYPERTENSION: ICD-10-CM

## 2023-11-27 DIAGNOSIS — Z12.12 ENCOUNTER FOR COLORECTAL CANCER SCREENING: ICD-10-CM

## 2023-11-27 DIAGNOSIS — I10 ESSENTIAL (PRIMARY) HYPERTENSION: Primary | ICD-10-CM

## 2023-11-27 DIAGNOSIS — E78.5 DYSLIPIDEMIA: ICD-10-CM

## 2023-11-27 DIAGNOSIS — Z12.11 ENCOUNTER FOR COLORECTAL CANCER SCREENING: ICD-10-CM

## 2023-11-27 DIAGNOSIS — M47.816 LUMBAR SPONDYLOSIS: ICD-10-CM

## 2023-11-27 PROCEDURE — 99214 OFFICE O/P EST MOD 30 MIN: CPT | Performed by: FAMILY MEDICINE

## 2023-11-27 RX ORDER — AMLODIPINE BESYLATE 5 MG/1
5 TABLET ORAL DAILY
Qty: 90 TABLET | Refills: 1 | Status: SHIPPED | OUTPATIENT
Start: 2023-11-27 | End: 2024-05-28 | Stop reason: SDUPTHER

## 2023-11-27 RX ORDER — BISOPROLOL FUMARATE AND HYDROCHLOROTHIAZIDE 5; 6.25 MG/1; MG/1
1 TABLET ORAL DAILY
Qty: 90 TABLET | Refills: 1 | Status: SHIPPED | OUTPATIENT
Start: 2023-11-27 | End: 2024-05-28 | Stop reason: SDUPTHER

## 2023-11-27 RX ORDER — BENAZEPRIL HYDROCHLORIDE 40 MG/1
TABLET ORAL
Qty: 30 TABLET | Refills: 5 | Status: CANCELLED | OUTPATIENT
Start: 2023-11-27

## 2023-11-27 RX ORDER — BENAZEPRIL HYDROCHLORIDE 40 MG/1
40 TABLET ORAL DAILY
Qty: 90 TABLET | Refills: 1 | Status: SHIPPED | OUTPATIENT
Start: 2023-11-27 | End: 2024-05-28 | Stop reason: SDUPTHER

## 2023-11-27 ASSESSMENT — ENCOUNTER SYMPTOMS
ABDOMINAL DISTENTION: 0
FEVER: 0
COUGH: 0
CONSTITUTIONAL NEGATIVE: 1
NERVOUS/ANXIOUS: 0
CHEST TIGHTNESS: 0
POLYDIPSIA: 0
MYALGIAS: 0
DYSURIA: 0
RECTAL PAIN: 0
SLEEP DISTURBANCE: 0
COLOR CHANGE: 0
HYPERTENSION: 1
SPEECH DIFFICULTY: 0
NECK STIFFNESS: 0
TROUBLE SWALLOWING: 0
FATIGUE: 0
HEMATURIA: 0
APPETITE CHANGE: 0
DIZZINESS: 0
EYE PAIN: 0
DIARRHEA: 0
ACTIVITY CHANGE: 0
SEIZURES: 0
POLYPHAGIA: 0
SORE THROAT: 0
AGITATION: 0
CONSTIPATION: 0
RHINORRHEA: 0
SINUS PRESSURE: 0
ARTHRALGIAS: 0
HEADACHES: 0
STRIDOR: 0
SINUS PAIN: 0
ADENOPATHY: 0
ABDOMINAL PAIN: 0
FLANK PAIN: 0
DECREASED CONCENTRATION: 0
CONFUSION: 0
BLOOD IN STOOL: 0
DYSPHORIC MOOD: 0
PHOTOPHOBIA: 0
PALPITATIONS: 0
SHORTNESS OF BREATH: 0

## 2023-11-27 NOTE — PATIENT INSTRUCTIONS
Follow up in 6 MONTHS     Continue current medications and therapy for chronic medical conditions.    Patient was advised importance of proper diet/nutrition in addition adequate hydration. Patient was encouraged moderate exercise program to include 30 minutes daily for 5 days of the week or 150 minutes weekly. Patient will follow-up with us as scheduled.    Review lab results from August 2023    OBTAIN FASTING LIPID AND CMP    Review ultrasound of breast from May 2023    Schedule colonoscopy

## 2023-11-27 NOTE — PROGRESS NOTES
"Subjective   Patient ID: Portia Vargas is a 63 y.o. female who presents for Hypertension.    Patient is here for follow up on hypertension.    Patient denies have any symptoms or concerns today.    Hypertension  Pertinent negatives include no chest pain, headaches, palpitations or shortness of breath.        Review of Systems   Constitutional: Negative.  Negative for activity change, appetite change, fatigue and fever.   HENT:  Negative for congestion, dental problem, ear discharge, ear pain, mouth sores, rhinorrhea, sinus pressure, sinus pain, sore throat, tinnitus and trouble swallowing.    Eyes:  Negative for photophobia, pain and visual disturbance.   Respiratory:  Negative for cough, chest tightness, shortness of breath and stridor.    Cardiovascular:  Negative for chest pain and palpitations.   Gastrointestinal:  Negative for abdominal distention, abdominal pain, blood in stool, constipation, diarrhea and rectal pain.   Endocrine: Negative for cold intolerance, heat intolerance, polydipsia, polyphagia and polyuria.   Genitourinary:  Negative for dysuria, flank pain, hematuria and urgency.   Musculoskeletal:  Negative for arthralgias, gait problem, myalgias and neck stiffness.   Skin:  Negative for color change and rash.   Allergic/Immunologic: Negative for environmental allergies and food allergies.   Neurological:  Negative for dizziness, seizures, syncope, speech difficulty and headaches.   Hematological:  Negative for adenopathy.   Psychiatric/Behavioral:  Negative for agitation, confusion, decreased concentration, dysphoric mood and sleep disturbance. The patient is not nervous/anxious.        Objective   /86   Pulse 65   Temp 36.7 °C (98.1 °F)   Resp 15   Ht 1.727 m (5' 8\")   Wt 82.5 kg (181 lb 12.8 oz)   SpO2 98%   BMI 27.64 kg/m²     Physical Exam  Vitals reviewed.   Constitutional:       General: She is not in acute distress.     Appearance: Normal appearance. She is normal weight. " She is not ill-appearing or diaphoretic.   HENT:      Head: Normocephalic.      Right Ear: Tympanic membrane and external ear normal.      Left Ear: Tympanic membrane and external ear normal.      Nose: Nose normal. No congestion.      Mouth/Throat:      Pharynx: No posterior oropharyngeal erythema.   Eyes:      General:         Right eye: No discharge.         Left eye: No discharge.      Extraocular Movements: Extraocular movements intact.      Conjunctiva/sclera: Conjunctivae normal.      Pupils: Pupils are equal, round, and reactive to light.   Cardiovascular:      Rate and Rhythm: Normal rate and regular rhythm.      Pulses: Normal pulses.      Heart sounds: Normal heart sounds. No murmur heard.  Pulmonary:      Effort: Pulmonary effort is normal. No respiratory distress.      Breath sounds: Normal breath sounds. No wheezing or rales.   Chest:      Chest wall: No tenderness.   Abdominal:      General: Abdomen is flat. Bowel sounds are normal. There is no distension.      Palpations: There is no mass.      Tenderness: There is no abdominal tenderness. There is no guarding.   Musculoskeletal:         General: No tenderness. Normal range of motion.      Cervical back: Normal range of motion and neck supple. No tenderness.      Right lower leg: No edema.      Left lower leg: No edema.   Skin:     General: Skin is dry.      Coloration: Skin is not jaundiced.      Findings: No bruising, erythema or rash.   Neurological:      General: No focal deficit present.      Mental Status: She is alert and oriented to person, place, and time. Mental status is at baseline.      Cranial Nerves: No cranial nerve deficit.      Sensory: No sensory deficit.      Coordination: Coordination normal.      Gait: Gait normal.   Psychiatric:         Mood and Affect: Mood normal.         Thought Content: Thought content normal.         Judgment: Judgment normal.         Assessment/Plan   Problem List Items Addressed This Visit    None  Visit  Diagnoses         Codes    Essential (primary) hypertension     I10              Scribe Attestation  By signing my name below, I, JUAN Franklin , Kandace   attest that this documentation has been prepared under the direction and in the presence of Shriaz Maldonado DO.   Provider Attestation - Scribe documentation    All medical record entries made by the Scribe were at my direction and personally dictated by me. I have reviewed the chart and agree that the record accurately reflects my personal performance of the history, physical exam, discussion and plan.

## 2023-11-29 RX ORDER — BISOPROLOL FUMARATE AND HYDROCHLOROTHIAZIDE 5; 6.25 MG/1; MG/1
1 TABLET ORAL DAILY
Qty: 30 TABLET | Refills: 0 | Status: SHIPPED | OUTPATIENT
Start: 2023-11-29

## 2023-11-30 ENCOUNTER — TELEPHONE (OUTPATIENT)
Dept: PRIMARY CARE | Facility: CLINIC | Age: 63
End: 2023-11-30
Payer: COMMERCIAL

## 2023-11-30 NOTE — TELEPHONE ENCOUNTER
[facility] calling to let us know she was admitted for Hip pain   Admitted 11/29/2023

## 2023-12-04 ENCOUNTER — PATIENT OUTREACH (OUTPATIENT)
Dept: PRIMARY CARE | Facility: CLINIC | Age: 63
End: 2023-12-04
Payer: COMMERCIAL

## 2023-12-04 NOTE — PROGRESS NOTES
Discharge Facility: [facility]  Discharge Diagnosis: Accidental fall; Closed fracture of left inferior pubic ramus; Closed fracture of superior ramus of left pubis; Closed fracture of symphysis pubis; Pubic ramus fracture; Unable to ambulate  Admission Date: 11/29/2023  Discharge Date: 12/1/2023    PCP Appointment Date: Patient declined during outreach call to make appt with PCP. States she will call in to office to make appt at a later date  Specialist Appointment Date: TBD with ortho doctor from [facility]  Hospital Encounter and Summary: not available at this time  See discharge assessment below for further details

## 2023-12-11 ENCOUNTER — TELEPHONE (OUTPATIENT)
Dept: PAIN MEDICINE | Facility: CLINIC | Age: 63
End: 2023-12-11

## 2023-12-13 ENCOUNTER — OFFICE VISIT (OUTPATIENT)
Dept: PAIN MEDICINE | Facility: CLINIC | Age: 63
End: 2023-12-13
Payer: COMMERCIAL

## 2023-12-13 VITALS
SYSTOLIC BLOOD PRESSURE: 159 MMHG | TEMPERATURE: 97.7 F | RESPIRATION RATE: 18 BRPM | HEART RATE: 72 BPM | DIASTOLIC BLOOD PRESSURE: 74 MMHG

## 2023-12-13 DIAGNOSIS — M96.1 POSTLAMINECTOMY SYNDROME OF LUMBAR REGION: ICD-10-CM

## 2023-12-13 DIAGNOSIS — G89.29 CHRONIC BILATERAL LOW BACK PAIN, UNSPECIFIED WHETHER SCIATICA PRESENT: Primary | ICD-10-CM

## 2023-12-13 DIAGNOSIS — M54.50 CHRONIC BILATERAL LOW BACK PAIN, UNSPECIFIED WHETHER SCIATICA PRESENT: Primary | ICD-10-CM

## 2023-12-13 PROCEDURE — 99213 OFFICE O/P EST LOW 20 MIN: CPT

## 2023-12-13 PROCEDURE — 3077F SYST BP >= 140 MM HG: CPT

## 2023-12-13 PROCEDURE — 3078F DIAST BP <80 MM HG: CPT

## 2023-12-13 PROCEDURE — 99213 OFFICE O/P EST LOW 20 MIN: CPT | Mod: ZK

## 2023-12-13 PROCEDURE — 4004F PT TOBACCO SCREEN RCVD TLK: CPT

## 2023-12-13 RX ORDER — TRAMADOL HYDROCHLORIDE 50 MG/1
50 TABLET ORAL 3 TIMES DAILY PRN
Qty: 90 TABLET | Refills: 2 | Status: SHIPPED | OUTPATIENT
Start: 2023-12-13 | End: 2024-03-12

## 2023-12-13 ASSESSMENT — PAIN SCALES - GENERAL: PAINLEVEL: 10-WORST PAIN EVER

## 2023-12-13 NOTE — PROGRESS NOTES
Subjective   Patient ID: Portia Vargas is a 63 y.o. female who presents for Med Refill.  HPI    64 yo female presents today for medication refills. She is known to this clinic for chronic low back pain and joint pain. She is maintained on tramadol 50mg TID and reports 50% pain reduction from the medication. She is currently out of tramadol, last fill date was 10/29. She slipped on ice on 11/29 and sustained fractures to left superior and inferior pubic rami, left pubic symphysis and left sacral ala. She was treated at [facility] and will be following up this Friday. She was given a 7 day prescription on 12/01 for tramadol 100mg TID following discharge from the hospital. She is using a walker. Today her pain is rated 10/10, she is reporting pain in the low back, groin and left thigh. Denies any bowel or bladder incontinence. She is taking acetaminophen for additional pain relief. The patient denies any side effects associated with the usage of the medication patient described the medication improving the quality of life and allowing participation in day-to-day activity patient denies otherwise any new complaint patient is requesting a refill. Patient denies usage of any other opiate. Patient denies usage of any recreational drugs. Patient confirms that the opiate prescription is being used as prescribed.           Review of Systems    All 13 systems were reviewed and are within normal levels except as noted below or per HPI. Positive and pertinent negative responses are noted below or in the HPI   Denied any fever or chills. No weight loss and no night sweats. No cough or sputum production. No diarrhea   The constipation has been responding to fibers and over the counter medications.     No bladder and bowel incontinence and no other changes in bladder and bowel. No skin changes. Reports tiredness and fatigability only if the pain is not controlled.   Denied opioids diversion and abuse and denies  alcoholism. Denies overuse of the pain medications.     Objective   Physical Exam  General   Alert and oriented x4, pleasant and cooperative.      HEENT  Pupils are equal and normal in size. Ears, nose, mouth, and throat appear to be WNL.  Head atraumatic, symmetric.      No signs of sedation or signs of withdrawal apparent.     Psychiatric   No signs of depression apparent. Appropriate mood and affect.     Neuro   No focal neurological deficit apparent. Ambulation at baseline using wheeled walker.      Respiratory  No respiratory distress, respirations equal and unlabored.     Abdomen  Soft and nontender, no distention noted.     Skin  Warm, dry and intact. No skin markings supportive of recent IV drug usage .           Assessment/Plan   Diagnoses and all orders for this visit:  Chronic bilateral low back pain, unspecified whether sciatica present  -     traMADol (Ultram) 50 mg tablet; Take 1 tablet (50 mg) by mouth 3 times a day as needed (pain).  Postlaminectomy syndrome of lumbar region  -     traMADol (Ultram) 50 mg tablet; Take 1 tablet (50 mg) by mouth 3 times a day as needed (pain).    [facility]. I have considered the risks of abuse, dependence, addiction and diversion. I believe that it is clinically appropriate for this patient to be prescribed this medication based on documented diagnosis.  I believe the benefits of the continuation of the opiate outweighs the risk. Patient has described positive response to the present medication therapy. Patient denies any side effects associated with the usage of the medication. Patient did not elicit any signs supportive of misuse or abuse. The review of the [place] [employer] prescription is not suggestive of any worrisome pattern. Patient believes the usage of this medication has improved the quality of life and allow him to participate in day-to-day activity. Therefore  I recommend the continuation of this  medication and I will be refilling the medication prescription.   Continue to take tramadol as prescribed.   Follow up in 3 months or as needed.        MARIELY Velasquez-CNP 12/13/23 2:06 PM

## 2023-12-13 NOTE — PATIENT INSTRUCTIONS
Continue to take tramadol as prescribed.     Follow up in 3 months or as needed.     Do not take more than 4000mg of Tylenol (acetaminophen) per day.

## 2024-02-06 ENCOUNTER — ANESTHESIA EVENT (OUTPATIENT)
Dept: GASTROENTEROLOGY | Facility: EXTERNAL LOCATION | Age: 64
End: 2024-02-06

## 2024-02-19 ENCOUNTER — APPOINTMENT (OUTPATIENT)
Dept: GASTROENTEROLOGY | Facility: EXTERNAL LOCATION | Age: 64
End: 2024-02-19
Payer: COMMERCIAL

## 2024-02-20 ENCOUNTER — ANESTHESIA (OUTPATIENT)
Dept: GASTROENTEROLOGY | Facility: EXTERNAL LOCATION | Age: 64
End: 2024-02-20

## 2024-02-20 ENCOUNTER — HOSPITAL ENCOUNTER (OUTPATIENT)
Dept: GASTROENTEROLOGY | Facility: EXTERNAL LOCATION | Age: 64
Discharge: HOME | End: 2024-02-20
Payer: COMMERCIAL

## 2024-02-20 VITALS
SYSTOLIC BLOOD PRESSURE: 112 MMHG | TEMPERATURE: 96.8 F | OXYGEN SATURATION: 99 % | RESPIRATION RATE: 16 BRPM | HEIGHT: 68 IN | WEIGHT: 180 LBS | DIASTOLIC BLOOD PRESSURE: 70 MMHG | HEART RATE: 65 BPM | BODY MASS INDEX: 27.28 KG/M2

## 2024-02-20 DIAGNOSIS — Z12.12 ENCOUNTER FOR COLORECTAL CANCER SCREENING: Primary | ICD-10-CM

## 2024-02-20 DIAGNOSIS — Z12.11 ENCOUNTER FOR COLORECTAL CANCER SCREENING: Primary | ICD-10-CM

## 2024-02-20 PROBLEM — Z72.0 TOBACCO ABUSE: Status: ACTIVE | Noted: 2024-02-20

## 2024-02-20 PROCEDURE — 88305 TISSUE EXAM BY PATHOLOGIST: CPT

## 2024-02-20 PROCEDURE — 45380 COLONOSCOPY AND BIOPSY: CPT | Performed by: INTERNAL MEDICINE

## 2024-02-20 PROCEDURE — 88305 TISSUE EXAM BY PATHOLOGIST: CPT | Performed by: PATHOLOGY

## 2024-02-20 PROCEDURE — 0753T DGTZ GLS MCRSCP SLD LEVEL IV: CPT

## 2024-02-20 RX ORDER — SODIUM CHLORIDE 9 MG/ML
INJECTION, SOLUTION INTRAVENOUS CONTINUOUS PRN
Status: DISCONTINUED | OUTPATIENT
Start: 2024-02-20 | End: 2024-02-20

## 2024-02-20 RX ORDER — LIDOCAINE HYDROCHLORIDE 20 MG/ML
INJECTION, SOLUTION INFILTRATION; PERINEURAL AS NEEDED
Status: DISCONTINUED | OUTPATIENT
Start: 2024-02-20 | End: 2024-02-20

## 2024-02-20 RX ORDER — SODIUM CHLORIDE 9 MG/ML
20 INJECTION, SOLUTION INTRAVENOUS CONTINUOUS
Status: DISCONTINUED | OUTPATIENT
Start: 2024-02-20 | End: 2024-02-21 | Stop reason: HOSPADM

## 2024-02-20 RX ORDER — PROPOFOL 10 MG/ML
INJECTION, EMULSION INTRAVENOUS AS NEEDED
Status: DISCONTINUED | OUTPATIENT
Start: 2024-02-20 | End: 2024-02-20

## 2024-02-20 RX ADMIN — SODIUM CHLORIDE: 9 INJECTION, SOLUTION INTRAVENOUS at 08:05

## 2024-02-20 RX ADMIN — PROPOFOL 50 MG: 10 INJECTION, EMULSION INTRAVENOUS at 08:15

## 2024-02-20 RX ADMIN — PROPOFOL 50 MG: 10 INJECTION, EMULSION INTRAVENOUS at 08:18

## 2024-02-20 RX ADMIN — PROPOFOL 100 MG: 10 INJECTION, EMULSION INTRAVENOUS at 08:08

## 2024-02-20 RX ADMIN — PROPOFOL 30 MG: 10 INJECTION, EMULSION INTRAVENOUS at 08:21

## 2024-02-20 RX ADMIN — LIDOCAINE HYDROCHLORIDE 50 MG: 20 INJECTION, SOLUTION INFILTRATION; PERINEURAL at 08:08

## 2024-02-20 SDOH — HEALTH STABILITY: MENTAL HEALTH: CURRENT SMOKER: 1

## 2024-02-20 ASSESSMENT — PAIN SCALES - GENERAL
PAINLEVEL_OUTOF10: 0 - NO PAIN
PAINLEVEL_OUTOF10: 0 - NO PAIN
PAIN_LEVEL: 0
PAINLEVEL_OUTOF10: 0 - NO PAIN
PAINLEVEL_OUTOF10: 0 - NO PAIN

## 2024-02-20 ASSESSMENT — PAIN - FUNCTIONAL ASSESSMENT
PAIN_FUNCTIONAL_ASSESSMENT: 0-10

## 2024-02-20 ASSESSMENT — COLUMBIA-SUICIDE SEVERITY RATING SCALE - C-SSRS
6. HAVE YOU EVER DONE ANYTHING, STARTED TO DO ANYTHING, OR PREPARED TO DO ANYTHING TO END YOUR LIFE?: NO
2. HAVE YOU ACTUALLY HAD ANY THOUGHTS OF KILLING YOURSELF?: NO
1. IN THE PAST MONTH, HAVE YOU WISHED YOU WERE DEAD OR WISHED YOU COULD GO TO SLEEP AND NOT WAKE UP?: NO

## 2024-02-20 NOTE — DISCHARGE INSTRUCTIONS
Patient Instructions Post Procedure      The anesthetics, sedatives or narcotics which were given to you today will be acting in your body for the next 24 hours, so you might feel a little sleepy or groggy.  This feeling should slowly wear off. Carefully read and follow the instructions.     You received sedation today:  - Do not drive or operate any machinery or power tools of any kind.   - No alcoholic beverages today, not even beer or wine.  - Do not make any important decisions or sign any legal documents.  - No over the counter medications that contain alcohol or that may cause drowsiness.    While it is common to experience mild to moderate abdominal distention, gas, or belching after your procedure, if any of these symptoms occur following discharge from the GI Lab or within one week of having your procedure, call the [facility] to be advised whether a visit to your nearest Urgent Care or Emergency Department is indicated.  Take this paper with you if you go.   - If you develop an allergic reaction to the medications that were given during your procedure such as difficulty breathing, rash, hives, severe nausea, vomiting or lightheadedness.  - If you experience chest pain, shortness of breath, severe abdominal pain, fevers and chills.  -If you develop signs and symptoms of bleeding such as blood in your spit, if your stools turn black, tarry, or bloody  - If you have not urinated within 8 hours following your procedure.  - If your IV site becomes painful, red, inflamed, or looks infected.    If you received a biopsy/polypectomy/sphincterotomy the following instructions apply below:  __ Do not use Aspirin containing products, non-steroidal medications or anti-coagulants for one week following your procedure. (Examples of these types of medications are: Advil, Arthrotec, Aleve, Coumadin, Ecotrin, Heparin, Ibuprofen, Indocin, Motrin, Naprosyn, Nuprin, Plavix, Vioxx, and Voltarin, or their generic  forms.  This list is not all-inclusive.  Check with your physician or pharmacist before resuming medications.)   __ Eat a soft diet today.  Avoid foods that are poorly digested for the next 24 hours.  These foods would include: nuts, beans, lettuce, red meats, and fried foods. Start with liquids and advance your diet as tolerated, gradually work up to eating solids.   __ Do not have a Barium Study or Enema for one week.    Your physician recommends the additional following instructions:    -You have a contact number available for emergencies. The signs and symptoms of potential delayed complications were discussed with you. You may return to normal activities tomorrow.  -Resume your previous diet or other if specified.  -Continue your present medications.   -We are waiting for your pathology results, if applicable.  -The findings and recommendations have been discussed with you and/or family.  - Please see Medication Reconciliation Form for new medication/medications prescribed.     If you experience any problems or have any questions following discharge from the GI Lab, please call: [phone] from 7 am- 4:30 pm.  After hours call [phone]  In the event of an emergency please go to the closest Emergency Department or call Dr. Troncoso

## 2024-02-20 NOTE — ANESTHESIA POSTPROCEDURE EVALUATION
Patient: Portia Vargas    Procedure Summary       Date: 02/20/24 Room / Location: [facility]    Anesthesia Start: 0807 Anesthesia Stop:     Procedure: COLONOSCOPY Diagnosis: Encounter for colorectal cancer screening    Scheduled Providers: Alexys Troncoso MD; Peggy Mosley MA; Jessika Morrell RN; GRADY Hanley Responsible Provider: GRADY Hanley    Anesthesia Type: MAC ASA Status: 2            Anesthesia Type: MAC    Vitals Value Taken Time   BP 99/70 02/20/24 0829   Temp 36 02/20/24 0829   Pulse 77 02/20/24 0829   Resp 13 02/20/24 0829   SpO2 97 02/20/24 0829       Anesthesia Post Evaluation    Patient location during evaluation: bedside  Patient participation: complete - patient participated  Level of consciousness: awake  Pain score: 0  Pain management: adequate  Airway patency: patent  Cardiovascular status: acceptable  Respiratory status: acceptable and room air  Hydration status: acceptable  Postoperative Nausea and Vomiting: none        No notable events documented.

## 2024-02-20 NOTE — PRE-SEDATION DOCUMENTATION
Patient: Portia Vargas  MRN: [id number]    Pre-sedation Evaluation:  Sedation necessary for: Analgesia  Requesting service: gi    History of Present Illness: screening colonoscopy     Past Medical History:   Diagnosis Date    Abnormal finding of blood chemistry, unspecified 05/21/2019    Abnormal blood chemistry    Abnormal findings on diagnostic imaging of other parts of digestive tract 08/25/2020    Abnormal colonoscopy    Encounter for immunization     Encounter for immunization    Encounter for other preprocedural examination 04/24/2020    Pre-op exam    Encounter for other screening for malignant neoplasm of breast 09/05/2019    Breast cancer screening    Encounter for screening for malignant neoplasm of colon 08/25/2020    Encounter for screening for malignant neoplasm of colon    Osteoarthritis of hip, unspecified 03/26/2020    Osteoarthritis of hip    Other acute postprocedural pain     Post-operative pain    Other conditions influencing health status 12/21/2020    History of cough    Pain in unspecified foot 08/27/2020    Foot pain    Personal history of other diseases of the circulatory system 05/21/2019    History of hypertension    Personal history of other medical treatment 01/26/2021    History of screening mammography    Personal history of other specified conditions 12/10/2019    History of tachycardia       Principle problems:  Patient Active Problem List    Diagnosis Date Noted    Tobacco abuse 02/20/2024    Atrophic vulvovaginitis 10/31/2023    Abnormal finding on mammography 09/22/2023    Arthralgia of hip 09/22/2023    Benign essential hypertension 09/22/2023    Colon polyps 09/22/2023    De Quervain's disease (tenosynovitis) 09/22/2023    Headache 09/22/2023    High grade squamous intraepithelial lesion (HGSIL), grade 3 JANI, on biopsy of cervix 09/22/2023    Left foot pain 09/22/2023    Lumbago 09/22/2023    Lumbar radiculopathy 09/22/2023    Lumbar spondylosis 09/22/2023    Osteoarthritis  of right hip 09/22/2023    Overweight (BMI 25.0-29.9) 09/22/2023    Right wrist pain 09/22/2023    Strain of lumbar region 09/22/2023    Vertigo 09/22/2023    Vitamin B12 deficiency 09/22/2023    Vitamin D deficiency 09/22/2023    Basal cell carcinoma of skin of other parts of face 05/26/2016     Allergies:  No Known Allergies  PTA/Current Medications:  (Not in a hospital admission)    Current Outpatient Medications   Medication Sig Dispense Refill    amLODIPine (Norvasc) 5 mg tablet Take 1 tablet (5 mg) by mouth once daily. 90 tablet 1    benazepril (Lotensin) 40 mg tablet Take 1 tablet (40 mg) by mouth once daily. 90 tablet 1    bisoproloL-hydrochlorothiazide (Ziac) 5-6.25 mg tablet Take 1 tablet by mouth once daily. as directed 30 tablet 0    bisoproloL-hydrochlorothiazide (Ziac) 5-6.25 mg tablet Take 1 tablet by mouth once daily. as directed 90 tablet 1    cyanocobalamin (Vitamin B-12) 100 mcg tablet Take 1 tablet (100 mcg) by mouth once daily.      traMADol (Ultram) 50 mg tablet Take 1 tablet (50 mg) by mouth 3 times a day as needed (pain). 90 tablet 2    acetaminophen (Tylenol) 500 mg tablet Take 2 tablets (1,000 mg) by mouth 3 times a day.      aspirin 81 mg EC tablet Take 1 tablet (81 mg) by mouth twice a day. 1 tab(s) orally 2 times a day for 30 days to prevent blood clot      diclofenac sodium (Voltaren) 1 % gel gel Apply 2.25 inches (2 g) topically 2 times a day.      docusate sodium (Colace) 100 mg capsule Take 1 capsule (100 mg) by mouth 2 times a day.      estradiol (Estrace) 0.01 % (0.1 mg/gram) vaginal cream Apply 1 g intravaginally nightly for the next 2 weeks then thereafter apply pea-sized amount (0.25 g) to vulva at at bedtime 1-2 x a week 42 g 3    gabapentin (Neurontin) 600 mg tablet Take 1 tablet (600 mg) by mouth 3 times a day.      meclizine (Antivert) 12.5 mg tablet Take 1 tablet (12.5 mg) by mouth 3 times a day. MAY TAKE 2 AT BEDTIME      methocarbamol (Robaxin) 500 mg tablet Take 1-2  tablets (500-1,000 mg) by mouth once daily at bedtime.      naloxone (Narcan) 4 mg/0.1 mL nasal spray Administer into affected nostril(s) if needed. Inhale 1 puff when necessary for signs of overdose      zoster vaccine-recombinant adjuvanted (Shingrix) 50 mcg/0.5 mL vaccine INJECT 0.5 ML INTRAMUSCULARLY ONCE THEN REPEAT DOSE IN 2-6 MONTHS 1 each 1     No current facility-administered medications for this encounter.     Past Surgical History:   has a past surgical history that includes Other surgical history (06/27/2020).    Recent sedation/surgery (24 hours) No    Review of Systems:  Please check all that apply: No significant medical history    Pregnancy test completed prior to procedure on any menstruating female: none        NPO guidelines met: Yes    Physical Exam    Airway   Cardiovascular - normal exam     Dental - normal exam     Pulmonary - normal exam         Plan    ASA 1     Deep

## 2024-02-20 NOTE — ANESTHESIA PREPROCEDURE EVALUATION
Patient: Portia Vargas    Procedure Information       Date/Time: 02/20/24 0800    Scheduled providers: Alexys Troncoso MD; Peggy Mosley MA; Jessika Morrell RN; MARIELY Hanley-CRNA    Procedure: COLONOSCOPY    Location: [facility]            Relevant Problems   Cardiovascular   (+) Benign essential hypertension      Neuro/Psych   (+) Lumbar radiculopathy      Musculoskeletal   (+) Lumbar spondylosis   (+) Osteoarthritis of right hip      Tobacco   (+) Tobacco abuse       Clinical information reviewed:    Allergies                NPO Detail:  No data recorded     Physical Exam    Airway  Mallampati: I  TM distance: >3 FB  Neck ROM: full     Cardiovascular - normal exam     Dental    Pulmonary   Breath sounds clear to auscultation     Abdominal   Abdomen: soft  Bowel sounds: normal     Other findings: 3 months from broken pelvis          Anesthesia Plan    History of general anesthesia?: yes  History of complications of general anesthesia?: no    ASA 2     MAC   (Preoxygenated 2L prior to procedure.  Patient positioned self to comfort prior to sedation administered; eyes closed; continuous monitoringPatient positioned self to comfort prior to sedation administered; eyes closed; continuous monitoring.)  The patient is a current smoker.  Patient was previously instructed to abstain from smoking on day of procedure.  Patient did not smoke on day of procedure.    intravenous induction   Anesthetic plan and risks discussed with patient.    Plan discussed with CRNA.

## 2024-02-26 ENCOUNTER — TELEPHONE (OUTPATIENT)
Dept: GASTROENTEROLOGY | Facility: EXTERNAL LOCATION | Age: 64
End: 2024-02-26
Payer: COMMERCIAL

## 2024-02-26 LAB
LABORATORY COMMENT REPORT: NORMAL
PATH REPORT.FINAL DX SPEC: NORMAL
PATH REPORT.GROSS SPEC: NORMAL
PATH REPORT.TOTAL CANCER: NORMAL

## 2024-02-29 ENCOUNTER — PATIENT MESSAGE (OUTPATIENT)
Dept: PRIMARY CARE | Facility: CLINIC | Age: 64
End: 2024-02-29
Payer: COMMERCIAL

## 2024-02-29 DIAGNOSIS — Z79.2 PROPHYLACTIC ANTIBIOTIC: ICD-10-CM

## 2024-02-29 RX ORDER — AMOXICILLIN 500 MG/1
1000 CAPSULE ORAL 2 TIMES DAILY
Qty: 4 CAPSULE | Refills: 0 | Status: SHIPPED | OUTPATIENT
Start: 2024-02-29 | End: 2024-03-01

## 2024-02-29 NOTE — TELEPHONE ENCOUNTER
From: Portia Vargas  To: Shiraz Maldonado DO  Sent: 2/29/2024 10:29 AM EST  Subject: Antibiotics    I have scheduled a dentist appt. For April 21, need to take an antibiotic before dental work can be performed. Please send prescription to [facility] in [place].

## 2024-03-11 ENCOUNTER — OFFICE VISIT (OUTPATIENT)
Dept: PAIN MEDICINE | Facility: CLINIC | Age: 64
End: 2024-03-11
Payer: COMMERCIAL

## 2024-03-11 VITALS
HEART RATE: 58 BPM | RESPIRATION RATE: 18 BRPM | DIASTOLIC BLOOD PRESSURE: 85 MMHG | TEMPERATURE: 97.7 F | SYSTOLIC BLOOD PRESSURE: 128 MMHG

## 2024-03-11 DIAGNOSIS — M54.42 CHRONIC BILATERAL LOW BACK PAIN WITH LEFT-SIDED SCIATICA: Primary | ICD-10-CM

## 2024-03-11 DIAGNOSIS — G89.29 CHRONIC BILATERAL LOW BACK PAIN WITH LEFT-SIDED SCIATICA: Primary | ICD-10-CM

## 2024-03-11 PROCEDURE — 99214 OFFICE O/P EST MOD 30 MIN: CPT | Performed by: NURSE PRACTITIONER

## 2024-03-11 PROCEDURE — 99214 OFFICE O/P EST MOD 30 MIN: CPT | Mod: ZK | Performed by: NURSE PRACTITIONER

## 2024-03-11 PROCEDURE — 3079F DIAST BP 80-89 MM HG: CPT | Performed by: NURSE PRACTITIONER

## 2024-03-11 PROCEDURE — 3074F SYST BP LT 130 MM HG: CPT | Performed by: NURSE PRACTITIONER

## 2024-03-11 RX ORDER — TRAMADOL HYDROCHLORIDE 50 MG/1
50 TABLET ORAL 3 TIMES DAILY PRN
Qty: 90 TABLET | Refills: 2 | Status: SHIPPED | OUTPATIENT
Start: 2024-03-12 | End: 2024-06-10

## 2024-03-11 ASSESSMENT — PAIN - FUNCTIONAL ASSESSMENT: PAIN_FUNCTIONAL_ASSESSMENT: 0-10

## 2024-03-11 ASSESSMENT — COLUMBIA-SUICIDE SEVERITY RATING SCALE - C-SSRS
6. HAVE YOU EVER DONE ANYTHING, STARTED TO DO ANYTHING, OR PREPARED TO DO ANYTHING TO END YOUR LIFE?: NO
1. IN THE PAST MONTH, HAVE YOU WISHED YOU WERE DEAD OR WISHED YOU COULD GO TO SLEEP AND NOT WAKE UP?: NO
2. HAVE YOU ACTUALLY HAD ANY THOUGHTS OF KILLING YOURSELF?: NO

## 2024-03-11 ASSESSMENT — ENCOUNTER SYMPTOMS
OCCASIONAL FEELINGS OF UNSTEADINESS: 0
LOSS OF SENSATION IN FEET: 0

## 2024-03-11 ASSESSMENT — PAIN SCALES - GENERAL
PAINLEVEL: 6
PAINLEVEL_OUTOF10: 6

## 2024-03-11 ASSESSMENT — PAIN DESCRIPTION - DESCRIPTORS: DESCRIPTORS: ACHING;SORE

## 2024-03-11 ASSESSMENT — PATIENT HEALTH QUESTIONNAIRE - PHQ9
2. FEELING DOWN, DEPRESSED OR HOPELESS: NOT AT ALL
1. LITTLE INTEREST OR PLEASURE IN DOING THINGS: NOT AT ALL
SUM OF ALL RESPONSES TO PHQ9 QUESTIONS 1 AND 2: 0

## 2024-03-11 NOTE — H&P
Subjective   Patient ID: Portia Vargas is a 63 y.o. female who presents for Med Refill.  HPI  63 years old, female she is here today for a follow up, refill of medications. She is known in this clinic because of chronic back pain, she is maintained on Tramadol 50 mg TID as needed for pain. She confirms today that they are still moderately beneficial in controlling her pain issues. Her level of pain at this time is about 5/10. [place]AR[place] obtained and reviewed, no abuse or misuse with prescribed medication noted.  She still able to perform activities of daily living independently.  She has been off work for now.  Review of Systems   Review of systems x 10 is negative.   No recent injury or falls reported.   No recent change in medical condition reported.   No recent weakness reported.   Still able to control bowel and bladder function.   Denies fever, cough, shortness of breath recently.   No interval change with medication/health issues reported.  She reports that she had slipped and fell on ice end of November, 2023, cracked her hip, states she's on Workman's comp, doing physical therapy at this time.  Objective   Physical Exam  Awake,alert, no acute distress, appropriate.  Spine is of normal curvature.  Full ROM on all 4 extremities, sensation and motor intact, no vascular compromise.  No pedal edema, normal gait.  Skin warm, dry, intact, turgor is normal.  Occasional left leg numbness, tingling.  /85   Pulse 58   Temp 36.5 °C (97.7 °F)   Resp 18       Assessment/Plan     I have personally reviewed the OARRS report for this patient. I have considered the risk of abuse, dependence, addiction and diversion.  I believe that it is clinically appropriate for this patient  to be prescribed this medication based on documented diagnosis.  Continue Tramadol as prescribed.  Follow up in 3 months time or as needed basis  Explained plan to this patient, and patient verbalized understanding and agreement with the  plan. If there is questions or concerns, please feel free to contact me to clarify.     Chronic back pain associated with lumbago, lumbar spondylosis, lumbar radiculopathy, post laminectomy syndrome.   She also has left hip, left leg pain associated with the fall she suffered about 3 months ago.         Marleen Giron, MARIELY-CNP 03/11/24 3:01 PM

## 2024-03-28 ENCOUNTER — LAB (OUTPATIENT)
Dept: LAB | Facility: LAB | Age: 64
End: 2024-03-28
Payer: COMMERCIAL

## 2024-03-28 DIAGNOSIS — E78.5 DYSLIPIDEMIA: ICD-10-CM

## 2024-03-28 LAB
ALBUMIN SERPL BCP-MCNC: 4.4 G/DL (ref 3.4–5)
ALP SERPL-CCNC: 104 U/L (ref 33–136)
ALT SERPL W P-5'-P-CCNC: 17 U/L (ref 7–45)
ANION GAP SERPL CALC-SCNC: 8 MMOL/L (ref 10–20)
AST SERPL W P-5'-P-CCNC: 14 U/L (ref 9–39)
BILIRUB SERPL-MCNC: 0.6 MG/DL (ref 0–1.2)
BUN SERPL-MCNC: 17 MG/DL (ref 6–23)
CALCIUM SERPL-MCNC: 10.1 MG/DL (ref 8.6–10.3)
CHLORIDE SERPL-SCNC: 105 MMOL/L (ref 98–107)
CHOLEST SERPL-MCNC: 226 MG/DL (ref 0–199)
CHOLESTEROL/HDL RATIO: 3.4
CO2 SERPL-SCNC: 31 MMOL/L (ref 21–32)
CREAT SERPL-MCNC: 0.85 MG/DL (ref 0.5–1.05)
EGFRCR SERPLBLD CKD-EPI 2021: 78 ML/MIN/1.73M*2
GLUCOSE SERPL-MCNC: 103 MG/DL (ref 74–99)
HDLC SERPL-MCNC: 66.6 MG/DL
LDLC SERPL CALC-MCNC: 138 MG/DL
NON HDL CHOLESTEROL: 159 MG/DL (ref 0–149)
POTASSIUM SERPL-SCNC: 4.4 MMOL/L (ref 3.5–5.3)
PROT SERPL-MCNC: 6.6 G/DL (ref 6.4–8.2)
SODIUM SERPL-SCNC: 140 MMOL/L (ref 136–145)
TRIGL SERPL-MCNC: 107 MG/DL (ref 0–149)
VLDL: 21 MG/DL (ref 0–40)

## 2024-03-28 PROCEDURE — 36415 COLL VENOUS BLD VENIPUNCTURE: CPT

## 2024-03-28 PROCEDURE — 80053 COMPREHEN METABOLIC PANEL: CPT

## 2024-03-28 PROCEDURE — 80061 LIPID PANEL: CPT

## 2024-04-30 ENCOUNTER — APPOINTMENT (OUTPATIENT)
Dept: OBSTETRICS AND GYNECOLOGY | Facility: CLINIC | Age: 64
End: 2024-04-30
Payer: COMMERCIAL

## 2024-05-15 ENCOUNTER — OFFICE VISIT (OUTPATIENT)
Dept: OBSTETRICS AND GYNECOLOGY | Facility: CLINIC | Age: 64
End: 2024-05-15
Payer: COMMERCIAL

## 2024-05-15 VITALS
BODY MASS INDEX: 27.8 KG/M2 | WEIGHT: 183.4 LBS | HEIGHT: 68 IN | DIASTOLIC BLOOD PRESSURE: 90 MMHG | SYSTOLIC BLOOD PRESSURE: 140 MMHG

## 2024-05-15 DIAGNOSIS — N95.2 ATROPHIC VAGINITIS: Primary | ICD-10-CM

## 2024-05-15 DIAGNOSIS — D06.9 HIGH GRADE SQUAMOUS INTRAEPITHELIAL LESION (HGSIL), GRADE 3 CIN, ON BIOPSY OF CERVIX: ICD-10-CM

## 2024-05-15 PROCEDURE — 99213 OFFICE O/P EST LOW 20 MIN: CPT | Performed by: OBSTETRICS & GYNECOLOGY

## 2024-05-15 PROCEDURE — 88175 CYTOPATH C/V AUTO FLUID REDO: CPT

## 2024-05-15 PROCEDURE — 88141 CYTOPATH C/V INTERPRET: CPT | Performed by: PATHOLOGY

## 2024-05-15 PROCEDURE — 3077F SYST BP >= 140 MM HG: CPT | Performed by: OBSTETRICS & GYNECOLOGY

## 2024-05-15 PROCEDURE — 3080F DIAST BP >= 90 MM HG: CPT | Performed by: OBSTETRICS & GYNECOLOGY

## 2024-05-15 PROCEDURE — 87624 HPV HI-RISK TYP POOLED RSLT: CPT

## 2024-05-15 NOTE — PROGRESS NOTES
"GYNECOLOGY PROGRESS NOTE          CC:     Chief Complaint   Patient presents with    Follow-up     Est patient - patient mentioned that she is here for follow up from her hysterectomy, repeat pap w/HPV  Chaperone trevor burgess ma        HPI:  Portia Vargas scheduled today for repeat pap smear after TLH for recurrent HGSIL.    Patient has no AUB or abnormal discharge.  No new GYN complaints.  Prior postop vaginal spotting resolved after use of estrogen cream Rx.      ROS:  GYN - see HPI      HISTORY:  Past Surgical History:   Procedure Laterality Date    CERVICAL BIOPSY  W/ LOOP ELECTRODE EXCISION  2022    HGSIL    COLPOSCOPY      LAPAROSCOPIC HYSTERECTOMY  2023    TLH/BSO - for persistent HGSIL    OTHER SURGICAL HISTORY  06/27/2020    Hip replacement    TOTAL HIP ARTHROPLASTY       Social History     Tobacco Use    Smoking status: Every Day     Types: Cigarettes     Passive exposure: Never    Smokeless tobacco: Never   Substance Use Topics    Alcohol use: Not Currently    Drug use: Never          PHYSICAL EXAM:  /90 (BP Location: Left arm, Patient Position: Sitting)   Ht 1.715 m (5' 7.5\")   Wt 83.2 kg (183 lb 6.4 oz)   BMI 28.30 kg/m²   GEN:  A&O, NAD  :   mildly atrophic urethral meatus, no bladder TTP  GYN:  mildly atrophic vulva and perineum w/o lesions or ulcers, mildly atrophic vaginal rugae without discharge or lesions, cervix/uterus/adnexa surgically absent  PSYCH:  normal affect, non-anxious      IMPRESSION/PLAN:    Problem List Items Addressed This Visit       High grade squamous intraepithelial lesion (HGSIL), grade 3 JANI, on biopsy of cervix    Relevant Orders    THINPREP PAP TEST     Other Visit Diagnoses       Atrophic vaginitis    -  Primary          HGSIL:  S/P TLH for recurrent HGSIL/CIN3.  Hysterectomy PATH results=+ margins of HGSIL--per my discussion with Dr. Delmi Coyle who did her surgery recommendations-repeat Pap and HPV testing in 6-12 months and only needs colposcopy (with " her) if any HGSIL paps.  Per ASCCP guidelines post-LEEP surveillance plan is repeat Pap/HPV testing at 6 months.  Repeat pap/HPV done today.  Smoking cessation:  n/a.  Gardasil vaccination:  n/a    Atrophic vulvovaginitis:  Continue use of Rx estradiol vaginal cream, recommend off-label perineal use.      Follow-up 1 year for annual examination with repeat Pap/HPV testing.      Ivan Manriqeu, DO

## 2024-05-24 LAB
CYTOLOGY CMNT CVX/VAG CYTO-IMP: NORMAL
HPV HR 12 DNA GENITAL QL NAA+PROBE: NEGATIVE
HPV HR GENOTYPES PNL CVX NAA+PROBE: NEGATIVE
HPV16 DNA SPEC QL NAA+PROBE: NEGATIVE
HPV18 DNA SPEC QL NAA+PROBE: NEGATIVE
LAB AP HPV GENOTYPE QUESTION: YES
LAB AP HPV HR: NORMAL
LAB AP PREVIOUS ABNORMAL HISTORY: NORMAL
LABORATORY COMMENT REPORT: NORMAL
MENSTRUAL HX REPORTED: NORMAL
PATH REPORT.TOTAL CANCER: NORMAL

## 2024-05-28 ENCOUNTER — OFFICE VISIT (OUTPATIENT)
Dept: PRIMARY CARE | Facility: CLINIC | Age: 64
End: 2024-05-28
Payer: COMMERCIAL

## 2024-05-28 VITALS
OXYGEN SATURATION: 94 % | DIASTOLIC BLOOD PRESSURE: 80 MMHG | WEIGHT: 182 LBS | HEIGHT: 68 IN | TEMPERATURE: 97.2 F | RESPIRATION RATE: 16 BRPM | HEART RATE: 59 BPM | BODY MASS INDEX: 27.58 KG/M2 | SYSTOLIC BLOOD PRESSURE: 110 MMHG

## 2024-05-28 DIAGNOSIS — I10 ESSENTIAL (PRIMARY) HYPERTENSION: Primary | ICD-10-CM

## 2024-05-28 DIAGNOSIS — R13.14 PHARYNGOESOPHAGEAL DYSPHAGIA: ICD-10-CM

## 2024-05-28 DIAGNOSIS — E78.5 DYSLIPIDEMIA: ICD-10-CM

## 2024-05-28 DIAGNOSIS — Z12.31 ENCOUNTER FOR SCREENING MAMMOGRAM FOR BREAST CANCER: ICD-10-CM

## 2024-05-28 DIAGNOSIS — E55.9 VITAMIN D DEFICIENCY: ICD-10-CM

## 2024-05-28 PROCEDURE — 99214 OFFICE O/P EST MOD 30 MIN: CPT | Performed by: FAMILY MEDICINE

## 2024-05-28 RX ORDER — AMLODIPINE BESYLATE 5 MG/1
5 TABLET ORAL DAILY
Qty: 90 TABLET | Refills: 1 | Status: SHIPPED | OUTPATIENT
Start: 2024-05-28

## 2024-05-28 RX ORDER — BISOPROLOL FUMARATE AND HYDROCHLOROTHIAZIDE 5; 6.25 MG/1; MG/1
1 TABLET ORAL DAILY
Qty: 90 TABLET | Refills: 1 | Status: SHIPPED | OUTPATIENT
Start: 2024-05-28

## 2024-05-28 RX ORDER — BENAZEPRIL HYDROCHLORIDE 40 MG/1
40 TABLET ORAL DAILY
Qty: 90 TABLET | Refills: 1 | Status: SHIPPED | OUTPATIENT
Start: 2024-05-28

## 2024-05-28 ASSESSMENT — ENCOUNTER SYMPTOMS
SORE THROAT: 0
MYALGIAS: 0
CONFUSION: 0
CONSTIPATION: 0
HEMATURIA: 0
SPEECH DIFFICULTY: 0
HEADACHES: 0
PHOTOPHOBIA: 0
BLOOD IN STOOL: 0
COUGH: 0
DYSURIA: 0
ABDOMINAL PAIN: 0
DECREASED CONCENTRATION: 0
ARTHRALGIAS: 0
NECK STIFFNESS: 0
DIARRHEA: 0
SINUS PAIN: 0
DIZZINESS: 0
RHINORRHEA: 0
DYSPHORIC MOOD: 0
FLANK PAIN: 0
SINUS PRESSURE: 0
FEVER: 0
ABDOMINAL DISTENTION: 0
RECTAL PAIN: 0
ADENOPATHY: 0
PALPITATIONS: 0
AGITATION: 0
ACTIVITY CHANGE: 0
CHEST TIGHTNESS: 0
COLOR CHANGE: 0
CONSTITUTIONAL NEGATIVE: 1
FATIGUE: 0
EYE PAIN: 0
POLYPHAGIA: 0
POLYDIPSIA: 0
STRIDOR: 0
SHORTNESS OF BREATH: 0
SLEEP DISTURBANCE: 0
SEIZURES: 0
TROUBLE SWALLOWING: 0
APPETITE CHANGE: 0

## 2024-05-28 ASSESSMENT — PATIENT HEALTH QUESTIONNAIRE - PHQ9
2. FEELING DOWN, DEPRESSED OR HOPELESS: NOT AT ALL
SUM OF ALL RESPONSES TO PHQ9 QUESTIONS 1 AND 2: 0
1. LITTLE INTEREST OR PLEASURE IN DOING THINGS: NOT AT ALL

## 2024-05-28 ASSESSMENT — COLUMBIA-SUICIDE SEVERITY RATING SCALE - C-SSRS
1. IN THE PAST MONTH, HAVE YOU WISHED YOU WERE DEAD OR WISHED YOU COULD GO TO SLEEP AND NOT WAKE UP?: NO
2. HAVE YOU ACTUALLY HAD ANY THOUGHTS OF KILLING YOURSELF?: NO
6. HAVE YOU EVER DONE ANYTHING, STARTED TO DO ANYTHING, OR PREPARED TO DO ANYTHING TO END YOUR LIFE?: NO

## 2024-05-28 NOTE — PATIENT INSTRUCTIONS
Follow up in 1 week post CT cardiac score    Continue current medications and therapy for chronic medical conditions.    Patient was advised importance of proper diet/nutrition in addition adequate hydration. Patient was encouraged moderate exercise program to include 30 minutes daily for 5 days of the week or 150 minutes weekly. Patient will follow-up with us as scheduled.    Review lab results from March 2024    Advise low-cholesterol diet, minimizing red meats, cheeses and fried foods     Repeat/obtain Fasting Lipid, CMP and Vitamin D in 3 months     Obtain CT Cardiac Score    Consider EGD    Schedule for screening mammogram

## 2024-05-28 NOTE — PROGRESS NOTES
"Subjective   Patient ID: Portia Vargas is a 63 y.o. female who presents for Follow-up.    Pt is in office to follow up on her hysterectomy and her blood work results.     Pt would also like to discuss her having trouble eating. She states that occasionally when she swallows food her chest hurts but will go away right after.        Review of Systems   Constitutional: Negative.  Negative for activity change, appetite change, fatigue and fever.   HENT:  Negative for congestion, dental problem, ear discharge, ear pain, mouth sores, rhinorrhea, sinus pressure, sinus pain, sore throat, tinnitus and trouble swallowing.    Eyes:  Negative for photophobia, pain and visual disturbance.   Respiratory:  Negative for cough, chest tightness, shortness of breath and stridor.    Cardiovascular:  Negative for chest pain and palpitations.   Gastrointestinal:  Negative for abdominal distention, abdominal pain, blood in stool, constipation, diarrhea and rectal pain.   Endocrine: Negative for cold intolerance, heat intolerance, polydipsia, polyphagia and polyuria.   Genitourinary:  Negative for dysuria, flank pain, hematuria and urgency.   Musculoskeletal:  Negative for arthralgias, gait problem, myalgias and neck stiffness.   Skin:  Negative for color change and rash.   Allergic/Immunologic: Negative for environmental allergies and food allergies.   Neurological:  Negative for dizziness, seizures, syncope, speech difficulty and headaches.   Hematological:  Negative for adenopathy.   Psychiatric/Behavioral:  Negative for agitation, confusion, decreased concentration, dysphoric mood and sleep disturbance. The patient is nervous/anxious.        Objective   /80 (BP Location: Right arm, Patient Position: Sitting)   Pulse 59   Temp 36.2 °C (97.2 °F) (Temporal)   Resp 16   Ht 1.715 m (5' 7.5\")   Wt 82.6 kg (182 lb)   SpO2 94%   BMI 28.08 kg/m²     Physical Exam  Vitals reviewed.   Constitutional:       General: She is not " in acute distress.     Appearance: Normal appearance. She is normal weight. She is not ill-appearing or diaphoretic.   HENT:      Head: Normocephalic.      Right Ear: Tympanic membrane and external ear normal.      Left Ear: Tympanic membrane and external ear normal.      Nose: Nose normal. No congestion.      Mouth/Throat:      Pharynx: No posterior oropharyngeal erythema.   Eyes:      General:         Right eye: No discharge.         Left eye: No discharge.      Extraocular Movements: Extraocular movements intact.      Conjunctiva/sclera: Conjunctivae normal.      Pupils: Pupils are equal, round, and reactive to light.   Cardiovascular:      Rate and Rhythm: Normal rate and regular rhythm.      Pulses: Normal pulses.      Heart sounds: Normal heart sounds. No murmur heard.  Pulmonary:      Effort: Pulmonary effort is normal. No respiratory distress.      Breath sounds: Normal breath sounds. No wheezing or rales.   Chest:      Chest wall: No tenderness.   Abdominal:      General: Abdomen is flat. Bowel sounds are normal. There is no distension.      Palpations: There is no mass.      Tenderness: There is no abdominal tenderness. There is no guarding.   Musculoskeletal:         General: No tenderness. Normal range of motion.      Cervical back: Normal range of motion and neck supple. No tenderness.      Right lower leg: No edema.      Left lower leg: No edema.   Skin:     General: Skin is dry.      Coloration: Skin is not jaundiced.      Findings: No bruising, erythema or rash.   Neurological:      General: No focal deficit present.      Mental Status: She is alert and oriented to person, place, and time. Mental status is at baseline.      Cranial Nerves: No cranial nerve deficit.      Sensory: No sensory deficit.      Coordination: Coordination normal.      Gait: Gait normal.   Psychiatric:         Thought Content: Thought content normal.         Judgment: Judgment normal.      Comments: Anxious          Assessment/Plan   Problem List Items Addressed This Visit             ICD-10-CM    Vitamin D deficiency E55.9    Relevant Orders    Vitamin D 25-Hydroxy,Total (for eval of Vitamin D levels)     Other Visit Diagnoses         Codes    Essential (primary) hypertension    -  Primary I10    Relevant Medications    benazepril (Lotensin) 40 mg tablet    amLODIPine (Norvasc) 5 mg tablet    bisoproloL-hydrochlorothiazide (Ziac) 5-6.25 mg tablet    Dyslipidemia     E78.5    Relevant Orders    Comprehensive Metabolic Panel    Lipid Panel    CT cardiac scoring wo IV contrast    Encounter for screening mammogram for breast cancer     Z12.31    Relevant Orders    BI mammo bilateral screening tomosynthesis    Pharyngoesophageal dysphagia     R13.14              Scribe Attestation  By signing my name below, I, JUAN Franklin , Miaibgerson   attest that this documentation has been prepared under the direction and in the presence of Shiraz Maldoando DO.   Provider Attestation - Scribe documentation    All medical record entries made by the Scribe were at my direction and personally dictated by me. I have reviewed the chart and agree that the record accurately reflects my personal performance of the history, physical exam, discussion and plan.

## 2024-06-09 ASSESSMENT — ENCOUNTER SYMPTOMS: NERVOUS/ANXIOUS: 1

## 2024-06-11 NOTE — PROGRESS NOTES
Patient ID: Portia Vargas is a 63 y.o. female.  Primary Care Provider: Shiraz Maldonado,     Subjective    HPI:  referred by Dr. Manrique     62 with cervical dysplasia     Dysplasia History:  -  PAP ASC-H, colposcopy wnl  -  PAP was LGSIL, colposcopy wnl  - 2022 underwent a LEEP for a high grade PAP, LEEP pathology consistent with high grade dysplasia with positive ectocervical margins  - 10/2022 repeat PAP was suspicious for cervical cancer.  - 2023 Dr. Manrique did colposcopy biopsies of the cervix at 6 and 9 o'clock were positive for high grade squamous intra epithelial lesion/CIN3 without obvious evidence of invasive carcinoma. Of note Dr. Manrique's exam notable for a very scant amount of remaining  cervix for biopsy. ECC also was positive for high grade squamous intra epithelial lesion.  23:  TLH, BSO, cystoscopy- CIN3 with positive margins     Medical History:  - Arthritis  - Hypertension     Surgical History:  - Bilateral hip replacements  - LEEP procedure     Social History:  - Current smoker, smokes 1 cigarette per day, drinks alcohol occasionally, does not use illicit drugs.  - Lives with  and son, works at a post office.     Obstetrics/Gynecology History:  -  menopausal since   - 2 vaginal deliveries.  remote h/o abnormal pap smears and cryo, and cone biopsy in the past     Family History:  - Mother had breast cancer and passed at 61yo, father had lung cancer      Objective    Visit Vitals  /80 (BP Location: Right arm, Patient Position: Sitting, BP Cuff Size: Adult)   Pulse 62   Temp 36.5 °C (97.7 °F) (Temporal)   Resp 16        Interval history:  Patient referred back to us for abnormal pap needing colposcopy.     Pap 5/15/24:   Low grade squamous intraepithelial lesion (LSIL) - Vagina  Atypical squamous cells cannot exclude high grade squamous intraepithelial lesion (ASC-H) - Vagina    Patient states she has had some bleeding with wiping, was given estrogen vaginal  cream but not started using it.  She denies any issues with sex.  No other complaints.      Physical Exam:    Constitutional: Looks good  Eyes: PERRL  ENMT: Moist mucus membranes  Head/Neck: Supple. Symmetrical  Genitourinary:   Normal external female genitalia. No vulvar lesions noted  Speculum exam: Smooth vaginal walls without lesions or masses. Vaginal cuff visualized without lesions, atrophic changes.     Colposcopy: Adequate colposcopy performed after application of dilute acetic acid solution to the vaginal cuff.  There were some acetowhite changes and punctate vessels noted at 9 o'clock. 1 vaginal cuff biopsy taken. Monsel's and pressure were used for hemostasis.  The patient tolerated the procedure well.       Musculoskeletal: ROM intact, no joint swelling, normal strength  Extremities: No edema  Neurological: Alert and oriented x 3. Pleasant and cooperative.  Psychological: Appropriate mood and behavior  Skin: Warm and dry, no lesions, no rashes    A complete review of systems was performed and all systems were normal except what is noted in the interval history.        Assessment/Plan   Patient is a 63 year old female with abnormal pap, LGSIL, ASC-H s/p hyst.  Acetowhite changes on colposcopy.       PLAN:  Vaginal cuff biopsy, F/U results  If VAIN 1 pap in 1 year  If VAIN 2-3 refer for surgical management  Will get paps done at this office from now on  Encouraged to use Estrace as instructed

## 2024-06-12 ENCOUNTER — OFFICE VISIT (OUTPATIENT)
Dept: GYNECOLOGIC ONCOLOGY | Facility: CLINIC | Age: 64
End: 2024-06-12
Payer: COMMERCIAL

## 2024-06-12 VITALS
RESPIRATION RATE: 16 BRPM | OXYGEN SATURATION: 98 % | SYSTOLIC BLOOD PRESSURE: 132 MMHG | BODY MASS INDEX: 28.03 KG/M2 | WEIGHT: 181.66 LBS | TEMPERATURE: 97.7 F | HEART RATE: 62 BPM | DIASTOLIC BLOOD PRESSURE: 80 MMHG

## 2024-06-12 DIAGNOSIS — R87.629 ABNORMAL VAGINAL PAP SMEAR: Primary | ICD-10-CM

## 2024-06-12 PROCEDURE — 3075F SYST BP GE 130 - 139MM HG: CPT | Performed by: NURSE PRACTITIONER

## 2024-06-12 PROCEDURE — 57100 BIOPSY VAGINAL MUCOSA SIMPLE: CPT | Performed by: NURSE PRACTITIONER

## 2024-06-12 PROCEDURE — 3079F DIAST BP 80-89 MM HG: CPT | Performed by: NURSE PRACTITIONER

## 2024-06-12 ASSESSMENT — PAIN SCALES - GENERAL: PAINLEVEL: 0-NO PAIN

## 2024-06-17 ENCOUNTER — OFFICE VISIT (OUTPATIENT)
Dept: PAIN MEDICINE | Facility: CLINIC | Age: 64
End: 2024-06-17
Payer: COMMERCIAL

## 2024-06-17 VITALS
OXYGEN SATURATION: 97 % | DIASTOLIC BLOOD PRESSURE: 82 MMHG | TEMPERATURE: 96.4 F | SYSTOLIC BLOOD PRESSURE: 127 MMHG | HEART RATE: 67 BPM

## 2024-06-17 DIAGNOSIS — G89.29 CHRONIC BILATERAL LOW BACK PAIN, UNSPECIFIED WHETHER SCIATICA PRESENT: Primary | ICD-10-CM

## 2024-06-17 DIAGNOSIS — M54.50 CHRONIC BILATERAL LOW BACK PAIN, UNSPECIFIED WHETHER SCIATICA PRESENT: Primary | ICD-10-CM

## 2024-06-17 PROCEDURE — 99213 OFFICE O/P EST LOW 20 MIN: CPT | Performed by: NURSE PRACTITIONER

## 2024-06-17 RX ORDER — TRAMADOL HYDROCHLORIDE 50 MG/1
50 TABLET ORAL 3 TIMES DAILY PRN
Qty: 90 TABLET | Refills: 2 | Status: SHIPPED | OUTPATIENT
Start: 2024-06-17 | End: 2024-09-15

## 2024-06-17 ASSESSMENT — ENCOUNTER SYMPTOMS
LOSS OF SENSATION IN FEET: 0
OCCASIONAL FEELINGS OF UNSTEADINESS: 0

## 2024-06-17 ASSESSMENT — PAIN DESCRIPTION - DESCRIPTORS: DESCRIPTORS: ACHING

## 2024-06-17 ASSESSMENT — COLUMBIA-SUICIDE SEVERITY RATING SCALE - C-SSRS
2. HAVE YOU ACTUALLY HAD ANY THOUGHTS OF KILLING YOURSELF?: NO
6. HAVE YOU EVER DONE ANYTHING, STARTED TO DO ANYTHING, OR PREPARED TO DO ANYTHING TO END YOUR LIFE?: NO
1. IN THE PAST MONTH, HAVE YOU WISHED YOU WERE DEAD OR WISHED YOU COULD GO TO SLEEP AND NOT WAKE UP?: NO

## 2024-06-17 ASSESSMENT — PAIN SCALES - GENERAL: PAINLEVEL_OUTOF10: 5 - MODERATE PAIN

## 2024-06-17 ASSESSMENT — PAIN - FUNCTIONAL ASSESSMENT: PAIN_FUNCTIONAL_ASSESSMENT: 0-10

## 2024-06-17 NOTE — H&P
History Of Present Illness  Portia Vargas is a 63 y.o. female presenting for follow up, refill of medications.   She is known in this clinic because of chronic back pain, she is maintained on Tramadol 50 mg TID as needed for pain. She confirms today that they are still moderately beneficial in controlling her pain issues. Her level of pain at this time is about 5/10. OARRS obtained and reviewed, no abuse or misuse with prescribed medication noted.   She described positive response to the present medication therapy. Denies any side effects associated with the usage of the medication.   She still able to perform activities of daily living independently.   Continues to work on regular basis at this time.    Past Medical History  Past Medical History:   Diagnosis Date    Chronic back pain     Osteoarthritis of hip, unspecified 03/26/2020    Osteoarthritis of hip       Surgical History  Past Surgical History:   Procedure Laterality Date    CERVICAL BIOPSY  W/ LOOP ELECTRODE EXCISION  2022    HGSIL    COLPOSCOPY      LAPAROSCOPIC HYSTERECTOMY  2023    TLH/BSO - for persistent HGSIL    OTHER SURGICAL HISTORY  06/27/2020    Hip replacement    TOTAL HIP ARTHROPLASTY          Social History  She reports that she has been smoking cigarettes. She has never been exposed to tobacco smoke. She has never used smokeless tobacco. She reports that she does not currently use alcohol. She reports that she does not use drugs.    Family History  Family History   Problem Relation Name Age of Onset    Hypertension Mother      Breast cancer Mother      Diabetes Father          Allergies  Patient has no known allergies.    Review of Systems    Review of systems x 10 is negative.   No recent injury or falls reported.   No recent change in medical condition reported.   No recent weakness reported.   Still able to control bowel and bladder function.  Denies any problem with constipation.   Denies fever, cough, shortness of breath  recently.   No interval change with medication/health issues reported.  Denies opioids diversion and abuse. Denies overuse of pain medications.  States that her hips are feeling a lot better now.  Physical Exam   Physical Exam  Awake,alert, no acute distress, appropriate.  Spine is of normal curvature.  Full ROM on all 4 extremities, sensation and motor intact, no vascular compromise.  No pedal edema, normal gait.  Skin warm, dry, intact, turgor is normal.  Occasional left leg numbness, tingling.  Last Recorded Vitals  Blood pressure 127/82, pulse 67, temperature 35.8 °C (96.4 °F), temperature source Temporal, SpO2 97%.    Relevant Results  No recent imaging noted.     Assessment/Plan     I have personally reviewed the OARRS report for this patient. I have considered the risk of abuse, dependence, addiction and diversion.  I believe that it is clinically appropriate for this patient  to be prescribed this medication based on documented diagnosis.  Continue Tramadol as prescribed.  She believes usage of medication improves quality of life and allows to participate day to day activity. She takes occasional Advil as needed when the pain is severe.  Follow up in 3 months time or as needed basis  Explained plan to this patient, and patient verbalized understanding and agreement with the plan. If there is questions or concerns, please feel free to contact me to clarify at [phone], M-F 8-4 PM.       Chronic back pain associated with lumbago, lumbar spondylosis, lumbar radiculopathy, post laminectomy syndrome.     I spent 30 minutes in the professional and overall care of this patient.      Marleen Giron, APRN-CNP

## 2024-06-18 LAB
LABORATORY COMMENT REPORT: NORMAL
PATH REPORT.FINAL DX SPEC: NORMAL
PATH REPORT.GROSS SPEC: NORMAL
PATH REPORT.RELEVANT HX SPEC: NORMAL
PATH REPORT.TOTAL CANCER: NORMAL

## 2024-06-19 ENCOUNTER — TELEPHONE (OUTPATIENT)
Dept: GYNECOLOGIC ONCOLOGY | Facility: HOSPITAL | Age: 64
End: 2024-06-19
Payer: COMMERCIAL

## 2024-06-19 NOTE — TELEPHONE ENCOUNTER
Phoned patient to notify her that vaginal biopsy results are negative and Christal Angulo CNP recommends keeping follow up as scheduled June 2025.   Message left on patient voice mail.

## 2024-07-01 ENCOUNTER — HOSPITAL ENCOUNTER (OUTPATIENT)
Dept: RADIOLOGY | Facility: HOSPITAL | Age: 64
Discharge: HOME | End: 2024-07-01
Payer: COMMERCIAL

## 2024-07-01 VITALS — WEIGHT: 180 LBS | BODY MASS INDEX: 28.25 KG/M2 | HEIGHT: 67 IN

## 2024-07-01 DIAGNOSIS — Z12.31 ENCOUNTER FOR SCREENING MAMMOGRAM FOR BREAST CANCER: ICD-10-CM

## 2024-07-01 DIAGNOSIS — E78.5 DYSLIPIDEMIA: ICD-10-CM

## 2024-07-01 PROCEDURE — 77067 SCR MAMMO BI INCL CAD: CPT

## 2024-07-01 PROCEDURE — 77063 BREAST TOMOSYNTHESIS BI: CPT | Performed by: RADIOLOGY

## 2024-07-01 PROCEDURE — 75571 CT HRT W/O DYE W/CA TEST: CPT

## 2024-07-01 PROCEDURE — 77067 SCR MAMMO BI INCL CAD: CPT | Performed by: RADIOLOGY

## 2024-07-05 ENCOUNTER — APPOINTMENT (OUTPATIENT)
Dept: PRIMARY CARE | Facility: CLINIC | Age: 64
End: 2024-07-05
Payer: COMMERCIAL

## 2024-07-29 ENCOUNTER — TELEPHONE (OUTPATIENT)
Dept: PRIMARY CARE | Facility: CLINIC | Age: 64
End: 2024-07-29
Payer: COMMERCIAL

## 2024-07-29 NOTE — TELEPHONE ENCOUNTER
----- Message from Shiraz Maldonado sent at 7/28/2024  4:41 PM EDT -----  Please notify patient that CT cardiac score indicates low risk for cardiovascular disease.  Thank you  ----- Message -----  From: [place], Radiology Results In  Sent: 7/1/2024   2:42 PM EDT  To: Shiraz Maldonado, DO

## 2024-08-05 ENCOUNTER — APPOINTMENT (OUTPATIENT)
Dept: PRIMARY CARE | Facility: CLINIC | Age: 64
End: 2024-08-05
Payer: COMMERCIAL

## 2024-09-05 DIAGNOSIS — G89.29 CHRONIC BILATERAL LOW BACK PAIN, UNSPECIFIED WHETHER SCIATICA PRESENT: Primary | ICD-10-CM

## 2024-09-05 DIAGNOSIS — M54.50 CHRONIC BILATERAL LOW BACK PAIN, UNSPECIFIED WHETHER SCIATICA PRESENT: Primary | ICD-10-CM

## 2024-09-10 RX ORDER — TRAMADOL HYDROCHLORIDE 50 MG/1
50 TABLET ORAL 3 TIMES DAILY PRN
Qty: 45 TABLET | Refills: 0 | Status: SHIPPED | OUTPATIENT
Start: 2024-09-15 | End: 2024-09-30

## 2024-09-18 ENCOUNTER — OFFICE VISIT (OUTPATIENT)
Dept: PAIN MEDICINE | Facility: CLINIC | Age: 64
End: 2024-09-18
Payer: COMMERCIAL

## 2024-09-18 VITALS
HEART RATE: 66 BPM | RESPIRATION RATE: 20 BRPM | SYSTOLIC BLOOD PRESSURE: 130 MMHG | DIASTOLIC BLOOD PRESSURE: 84 MMHG | OXYGEN SATURATION: 97 %

## 2024-09-18 DIAGNOSIS — G89.29 CHRONIC BILATERAL LOW BACK PAIN, UNSPECIFIED WHETHER SCIATICA PRESENT: ICD-10-CM

## 2024-09-18 DIAGNOSIS — M54.50 CHRONIC BILATERAL LOW BACK PAIN, UNSPECIFIED WHETHER SCIATICA PRESENT: ICD-10-CM

## 2024-09-18 PROCEDURE — 99213 OFFICE O/P EST LOW 20 MIN: CPT

## 2024-09-18 RX ORDER — TRAMADOL HYDROCHLORIDE 50 MG/1
50 TABLET ORAL 3 TIMES DAILY PRN
Qty: 90 TABLET | Refills: 2 | Status: SHIPPED | OUTPATIENT
Start: 2024-09-18 | End: 2024-12-17

## 2024-09-18 ASSESSMENT — PAIN DESCRIPTION - DESCRIPTORS: DESCRIPTORS: ACHING

## 2024-09-18 ASSESSMENT — PAIN SCALES - GENERAL
PAINLEVEL_OUTOF10: 5 - MODERATE PAIN
PAINLEVEL: 5

## 2024-09-18 ASSESSMENT — PAIN - FUNCTIONAL ASSESSMENT: PAIN_FUNCTIONAL_ASSESSMENT: 0-10

## 2024-09-18 NOTE — PROGRESS NOTES
Subjective   Patient ID: Portia Vargas is a 64 y.o. female who presents for Med Refill.  HPI  63 yo female presents today for medication refill. She is known to this clinic for chronic low back pain and is maintained on tramadol 50mg three times daily. She confirms that the medication is effective at reducing her overall pain level. Today her pain is rated 5/10 in the low back. The patient denies any side effects associated with the usage of the medication patient described the medication improving the quality of life and allowing participation in day-to-day activity patient denies otherwise any new complaint patient is requesting a refill. Patient denies usage of any other opiate. Patient denies usage of any recreational drugs. Patient confirms that the opiate prescription is being used as prescribed. She continues to work full time.     Review of Systems  All 13 systems were reviewed and are within normal levels except as noted below or per HPI. Positive and pertinent negative responses are noted below or in the HPI   Denied any fever or chills. No weight loss and no night sweats. No cough or sputum production. No diarrhea   Denies constipation.   No bladder and bowel incontinence and no other changes in bladder and bowel. No skin changes. Reports tiredness and fatigability only if the pain is not controlled.   Denied opioids diversion and abuse and denies alcoholism. Denies overuse of the pain medications.    Objective   Physical Exam  General   Alert and oriented x4, pleasant and cooperative.      HEENT  Pupils are equal and normal in size. Ears, nose, mouth, and throat appear to be WNL.  Head atraumatic, symmetric.      No signs of sedation or signs of withdrawal apparent.     Psychiatric   No signs of depression apparent. Appropriate mood and affect.     Neuro   No focal neurological deficit apparent. Ambulation at baseline.      Respiratory  No respiratory distress, respirations equal and unlabored.      Abdomen  Soft and nontender, no distention noted.     Skin  Warm, dry and intact. No skin markings supportive of recent IV drug usage .            Assessment/Plan     65 yo female with history and physical exam supportive of chronic low back pain associated with lumbar spondylosis, lumbar radiculopathy.      I have personally reviewed the OARRS report for this patient. I have considered the risks of abuse, dependence, addiction and diversion. I believe that it is clinically appropriate for this patient to be prescribed this medication based on documented diagnosis.  I believe the benefits of the continuation of the opiate outweighs the risk. Patient has described positive response to the present medication therapy. Patient denies any side effects associated with the usage of the medication. Patient did not elicit any signs supportive of misuse or abuse. The review of the [place] [employer] Reporting prescription is not suggestive of any worrisome pattern. Patient believes the usage of this medication has improved the quality of life and allow him to participate in day-to-day activity. Therefore  I recommend the continuation of this medication and I will be refilling the medication prescription.  Continue to take tramadol as prescribed.   CSA and tox screen today.   Follow up in 3 months or as needed.   Explained plan to this patient, and patient verbalized understanding and agreement with the plan.     Please do not hesitate to contact the pain clinic after your visit with any questions or concerns at  M-F 8-4 pm     Patient was reminded not to share medications, not to take prescription medications that were not prescribed to the patient, and not to increase or change dose without consulting the pain clinic. I advised the patient to always take the least amount of medication needed to keep symptoms under control.       Kaylene Duran, MARIELY-CNP 09/18/24 1:41 PM

## 2024-09-18 NOTE — PROGRESS NOTES
Subjective   Portia Vargas is a 64 y.o. female who presents for evaluation of her back, pelvis and hand pain.  The pt is here for medication refill.     Past Medical History:   Diagnosis Date    Chronic back pain     Osteoarthritis of hip, unspecified 03/26/2020    Osteoarthritis of hip     Past Surgical History:   Procedure Laterality Date    CERVICAL BIOPSY  W/ LOOP ELECTRODE EXCISION  2022    HGSIL    COLPOSCOPY      HYSTERECTOMY      LAPAROSCOPIC HYSTERECTOMY  2023    TLH/BSO - for persistent HGSIL    OTHER SURGICAL HISTORY  06/27/2020    Hip replacement    TOTAL HIP ARTHROPLASTY         I have reviewed the nurses notes and am aware of family/social history.     Review of Systems    Objective   Physical Exam    ASSESSMENT:     PLAN:   Discussed treatment plan with patient.   Call or return to clinic prn if these symptoms worsen or fail to improve as anticipated.     Neda Ng RN

## 2024-09-30 ENCOUNTER — APPOINTMENT (OUTPATIENT)
Dept: PRIMARY CARE | Facility: CLINIC | Age: 64
End: 2024-09-30
Payer: COMMERCIAL

## 2024-09-30 VITALS
HEIGHT: 67 IN | RESPIRATION RATE: 16 BRPM | OXYGEN SATURATION: 98 % | TEMPERATURE: 98.4 F | HEART RATE: 68 BPM | SYSTOLIC BLOOD PRESSURE: 122 MMHG | DIASTOLIC BLOOD PRESSURE: 72 MMHG | BODY MASS INDEX: 28.09 KG/M2 | WEIGHT: 179 LBS

## 2024-09-30 DIAGNOSIS — H61.21 IMPACTED CERUMEN, RIGHT EAR: ICD-10-CM

## 2024-09-30 DIAGNOSIS — R10.32 LEFT LOWER QUADRANT ABDOMINAL PAIN: ICD-10-CM

## 2024-09-30 DIAGNOSIS — R13.19 ESOPHAGEAL DYSPHAGIA: ICD-10-CM

## 2024-09-30 DIAGNOSIS — I10 BENIGN ESSENTIAL HYPERTENSION: Primary | ICD-10-CM

## 2024-09-30 DIAGNOSIS — R10.2 PELVIC PAIN: ICD-10-CM

## 2024-09-30 LAB
POC APPEARANCE, URINE: ABNORMAL
POC BILIRUBIN, URINE: NEGATIVE
POC BLOOD, URINE: ABNORMAL
POC COLOR, URINE: YELLOW
POC GLUCOSE, URINE: NEGATIVE MG/DL
POC KETONES, URINE: NEGATIVE MG/DL
POC LEUKOCYTES, URINE: NEGATIVE
POC NITRITE,URINE: NEGATIVE
POC PH, URINE: 6.5 PH
POC PROTEIN, URINE: NEGATIVE MG/DL
POC SPECIFIC GRAVITY, URINE: 1.02
POC UROBILINOGEN, URINE: 0.2 EU/DL

## 2024-09-30 PROCEDURE — 81003 URINALYSIS AUTO W/O SCOPE: CPT | Performed by: FAMILY MEDICINE

## 2024-09-30 PROCEDURE — 69209 REMOVE IMPACTED EAR WAX UNI: CPT | Performed by: FAMILY MEDICINE

## 2024-09-30 PROCEDURE — 99214 OFFICE O/P EST MOD 30 MIN: CPT | Performed by: FAMILY MEDICINE

## 2024-09-30 ASSESSMENT — ENCOUNTER SYMPTOMS
DYSURIA: 0
ARTHRALGIAS: 0
SINUS PRESSURE: 0
FLANK PAIN: 0
PHOTOPHOBIA: 0
POLYDIPSIA: 0
MYALGIAS: 0
DECREASED CONCENTRATION: 0
EYE PAIN: 0
OCCASIONAL FEELINGS OF UNSTEADINESS: 0
RECTAL PAIN: 0
POLYPHAGIA: 0
FATIGUE: 0
AGITATION: 0
ABDOMINAL DISTENTION: 0
SHORTNESS OF BREATH: 0
CHEST TIGHTNESS: 0
SINUS PAIN: 0
COUGH: 0
LOSS OF SENSATION IN FEET: 0
SPEECH DIFFICULTY: 0
HYPERTENSION: 1
BLOOD IN STOOL: 0
ACTIVITY CHANGE: 0
NECK STIFFNESS: 0
SEIZURES: 0
STRIDOR: 0
DEPRESSION: 0
DYSPHORIC MOOD: 0
SLEEP DISTURBANCE: 0
TROUBLE SWALLOWING: 1
RHINORRHEA: 0
CONSTITUTIONAL NEGATIVE: 1
ADENOPATHY: 0
HEADACHES: 0
NERVOUS/ANXIOUS: 0
COLOR CHANGE: 0
SORE THROAT: 0
DIARRHEA: 0
DIZZINESS: 0
PALPITATIONS: 0
ABDOMINAL PAIN: 0
FEVER: 0
WEAKNESS: 1
CONSTIPATION: 0
HEMATURIA: 0
CONFUSION: 0
APPETITE CHANGE: 0

## 2024-09-30 NOTE — PROGRESS NOTES
Patient ID: Portia Vargas is a 64 y.o. female.    Ear Cerumen Removal    Date/Time: 9/30/2024 11:50 AM    Performed by: Caridad Wilhelm MA  Authorized by: Shiraz Maldonado DO    Consent:     Consent obtained:  Verbal    Consent given by:  Patient  Procedure details:     Location:  R ear    Procedure type: irrigation      Procedure outcomes: cerumen removed    Post-procedure details:     Inspection:  Ear canal clear    Hearing quality:  Normal    Procedure completion:  Tolerated  Subjective   Patient ID: Portia Vargas is a 64 y.o. female who presents for Hypertension.    Patient states having problem when eat meat. Patient states the meat get stuck in her chest. Patient states have discomfort when it occur . Patient denies any pain.    Patient would like have her left ear check. Patient states having some tingling in it. Patient denies any pain or draining.     Patient would like discuss test results.    Patient had done CT cardiac scoring on 07/01/2024  Patient had done Bi mammo 07/01/2024    Patient denies any other symptoms or concerns today.    Hypertension  This is a recurrent problem. The current episode started more than 1 year ago. The problem is unchanged. Pertinent negatives include no chest pain, headaches, palpitations or shortness of breath. There are no associated agents to hypertension. There are no known risk factors for coronary artery disease.        Review of Systems   Constitutional: Negative.  Negative for activity change, appetite change, fatigue and fever.   HENT:  Positive for ear discharge, hearing loss and trouble swallowing. Negative for congestion, dental problem, ear pain, mouth sores, rhinorrhea, sinus pressure, sinus pain, sore throat and tinnitus.    Eyes:  Negative for photophobia, pain and visual disturbance.   Respiratory:  Negative for cough, chest tightness, shortness of breath and stridor.    Cardiovascular:  Negative for chest pain and palpitations.  "  Gastrointestinal:  Negative for abdominal distention, abdominal pain, blood in stool, constipation, diarrhea and rectal pain.   Endocrine: Negative for cold intolerance, heat intolerance, polydipsia, polyphagia and polyuria.   Genitourinary:  Negative for dysuria, flank pain, hematuria and urgency.   Musculoskeletal:  Negative for arthralgias, gait problem, myalgias and neck stiffness.   Skin:  Negative for color change and rash.   Allergic/Immunologic: Negative for environmental allergies and food allergies.   Neurological:  Positive for weakness (bilateral hand weakness). Negative for dizziness, seizures, syncope, speech difficulty and headaches.   Hematological:  Negative for adenopathy.   Psychiatric/Behavioral:  Negative for agitation, confusion, decreased concentration, dysphoric mood and sleep disturbance. The patient is not nervous/anxious.        Objective   /72 (BP Location: Right arm, Patient Position: Sitting, BP Cuff Size: Adult)   Pulse 68   Temp 36.9 °C (98.4 °F)   Resp 16   Ht 1.702 m (5' 7\")   Wt 81.2 kg (179 lb)   SpO2 98%   BMI 28.04 kg/m²     Physical Exam  Vitals reviewed.   Constitutional:       General: She is not in acute distress.     Appearance: Normal appearance. She is normal weight. She is not ill-appearing or diaphoretic.   HENT:      Head: Normocephalic.      Right Ear: Tympanic membrane and external ear normal. There is impacted cerumen.      Left Ear: Tympanic membrane and external ear normal.      Nose: Nose normal. No congestion.      Mouth/Throat:      Pharynx: No posterior oropharyngeal erythema.   Eyes:      General:         Right eye: No discharge.         Left eye: No discharge.      Extraocular Movements: Extraocular movements intact.      Conjunctiva/sclera: Conjunctivae normal.      Pupils: Pupils are equal, round, and reactive to light.   Cardiovascular:      Rate and Rhythm: Normal rate and regular rhythm.      Pulses: Normal pulses.      Heart sounds: " Normal heart sounds. No murmur heard.  Pulmonary:      Effort: Pulmonary effort is normal. No respiratory distress.      Breath sounds: Normal breath sounds. No wheezing or rales.   Chest:      Chest wall: No tenderness.   Abdominal:      General: Abdomen is flat. Bowel sounds are normal. There is no distension.      Palpations: There is no mass.      Tenderness: There is no abdominal tenderness. There is no guarding.   Musculoskeletal:         General: No tenderness. Normal range of motion.      Cervical back: Normal range of motion and neck supple. No tenderness.      Right lower leg: No edema.      Left lower leg: No edema.   Skin:     General: Skin is dry.      Coloration: Skin is not jaundiced.      Findings: No bruising, erythema or rash.   Neurological:      General: No focal deficit present.      Mental Status: She is alert and oriented to person, place, and time. Mental status is at baseline.      Cranial Nerves: No cranial nerve deficit.      Sensory: No sensory deficit.      Coordination: Coordination normal.      Gait: Gait normal.   Psychiatric:         Mood and Affect: Mood normal.         Thought Content: Thought content normal.         Judgment: Judgment normal.         Assessment/Plan   Problem List Items Addressed This Visit             ICD-10-CM    Benign essential hypertension - Primary I10     Other Visit Diagnoses         Codes    Esophageal dysphagia     R13.19    Relevant Orders    Esophagogastroduodenoscopy (EGD)    Impacted cerumen, right ear     H61.21    Relevant Orders    Ear cerumen removal    Pelvic pain     R10.2    Relevant Orders    US pelvis    POCT UA Automated manually resulted (Completed)    Left lower quadrant abdominal pain     R10.32    Relevant Orders    US abdomen limited    POCT UA Automated manually resulted (Completed)              Scribe Attestation  By signing my name below, Wen ESPINOZA RMA , Scribe   attest that this documentation has been prepared under  the direction and in the presence of Shiraz Maldonado DO.   Provider Attestation - Scribe documentation    All medical record entries made by the Scribe were at my direction and personally dictated by me. I have reviewed the chart and agree that the record accurately reflects my personal performance of the history, physical exam, discussion and plan.

## 2024-09-30 NOTE — PATIENT INSTRUCTIONS
Follow up in 3 months    Continue current medications and therapy for chronic medical conditions.    Patient was advised importance of proper diet/nutrition in addition adequate hydration. Patient was encouraged moderate exercise program to include 30 minutes daily for 5 days of the week or 150 minutes weekly. Patient will follow-up with us as scheduled.    In office urinalysis    Obtain EGD     Schedule ultrasound of the abdomen/pelvis    Right ear flush today

## 2024-10-07 ENCOUNTER — HOSPITAL ENCOUNTER (OUTPATIENT)
Dept: RADIOLOGY | Facility: CLINIC | Age: 64
Discharge: HOME | End: 2024-10-07
Payer: COMMERCIAL

## 2024-10-07 DIAGNOSIS — R10.2 PELVIC PAIN: ICD-10-CM

## 2024-10-07 DIAGNOSIS — R10.32 LEFT LOWER QUADRANT ABDOMINAL PAIN: ICD-10-CM

## 2024-10-07 PROCEDURE — 76705 ECHO EXAM OF ABDOMEN: CPT | Performed by: RADIOLOGY

## 2024-10-07 PROCEDURE — 76856 US EXAM PELVIC COMPLETE: CPT

## 2024-10-07 PROCEDURE — 76705 ECHO EXAM OF ABDOMEN: CPT

## 2024-10-07 PROCEDURE — 76830 TRANSVAGINAL US NON-OB: CPT | Performed by: RADIOLOGY

## 2024-10-07 PROCEDURE — 76856 US EXAM PELVIC COMPLETE: CPT | Performed by: RADIOLOGY

## 2024-10-14 ENCOUNTER — TELEPHONE (OUTPATIENT)
Dept: GASTROENTEROLOGY | Facility: EXTERNAL LOCATION | Age: 64
End: 2024-10-14
Payer: COMMERCIAL

## 2024-10-18 DIAGNOSIS — I10 ESSENTIAL (PRIMARY) HYPERTENSION: ICD-10-CM

## 2024-10-18 RX ORDER — BISOPROLOL FUMARATE AND HYDROCHLOROTHIAZIDE 5; 6.25 MG/1; MG/1
1 TABLET ORAL DAILY
Qty: 90 TABLET | Refills: 0 | Status: SHIPPED | OUTPATIENT
Start: 2024-10-18

## 2024-11-14 ENCOUNTER — APPOINTMENT (OUTPATIENT)
Dept: GASTROENTEROLOGY | Facility: EXTERNAL LOCATION | Age: 64
End: 2024-11-14
Payer: COMMERCIAL

## 2024-12-09 DIAGNOSIS — I10 ESSENTIAL (PRIMARY) HYPERTENSION: ICD-10-CM

## 2024-12-09 RX ORDER — BISOPROLOL FUMARATE AND HYDROCHLOROTHIAZIDE 5; 6.25 MG/1; MG/1
1 TABLET ORAL DAILY
Qty: 90 TABLET | Refills: 0 | Status: SHIPPED | OUTPATIENT
Start: 2024-12-09

## 2024-12-09 NOTE — TELEPHONE ENCOUNTER
Dr. Maldonado patient  Refill for:  bisoproloL-hydrochlorothiazide (Ziac) 5-6.25 mg tablet   [id number] - [place], [place] - [address] [facility]  [address] [facility] [address]  Phone: [phone]  Fax: [phone]

## 2024-12-16 ENCOUNTER — TELEPHONE (OUTPATIENT)
Dept: PAIN MEDICINE | Facility: CLINIC | Age: 64
End: 2024-12-16
Payer: COMMERCIAL

## 2024-12-17 ENCOUNTER — TELEPHONE (OUTPATIENT)
Dept: PAIN MEDICINE | Facility: CLINIC | Age: 64
End: 2024-12-17
Payer: COMMERCIAL

## 2024-12-20 ENCOUNTER — OFFICE VISIT (OUTPATIENT)
Dept: PAIN MEDICINE | Facility: CLINIC | Age: 64
End: 2024-12-20
Payer: COMMERCIAL

## 2024-12-20 VITALS
SYSTOLIC BLOOD PRESSURE: 141 MMHG | OXYGEN SATURATION: 96 % | RESPIRATION RATE: 20 BRPM | DIASTOLIC BLOOD PRESSURE: 83 MMHG | HEART RATE: 59 BPM

## 2024-12-20 DIAGNOSIS — M54.50 CHRONIC BILATERAL LOW BACK PAIN, UNSPECIFIED WHETHER SCIATICA PRESENT: ICD-10-CM

## 2024-12-20 DIAGNOSIS — M54.41 CHRONIC RIGHT-SIDED LOW BACK PAIN WITH RIGHT-SIDED SCIATICA: Primary | ICD-10-CM

## 2024-12-20 DIAGNOSIS — G89.29 CHRONIC RIGHT-SIDED LOW BACK PAIN WITH RIGHT-SIDED SCIATICA: Primary | ICD-10-CM

## 2024-12-20 DIAGNOSIS — G89.29 CHRONIC BILATERAL LOW BACK PAIN, UNSPECIFIED WHETHER SCIATICA PRESENT: ICD-10-CM

## 2024-12-20 PROCEDURE — 99213 OFFICE O/P EST LOW 20 MIN: CPT

## 2024-12-20 RX ORDER — TRAMADOL HYDROCHLORIDE 50 MG/1
50 TABLET ORAL 3 TIMES DAILY PRN
Qty: 90 TABLET | Refills: 2 | Status: SHIPPED | OUTPATIENT
Start: 2024-12-20 | End: 2025-03-20

## 2024-12-20 ASSESSMENT — PAIN DESCRIPTION - DESCRIPTORS: DESCRIPTORS: ACHING;THROBBING

## 2024-12-20 ASSESSMENT — PAIN SCALES - GENERAL
PAINLEVEL_OUTOF10: 7
PAINLEVEL_OUTOF10: 7

## 2024-12-20 ASSESSMENT — PAIN - FUNCTIONAL ASSESSMENT: PAIN_FUNCTIONAL_ASSESSMENT: 0-10

## 2024-12-20 NOTE — PROGRESS NOTES
"Subjective   Patient ID: Portia Vargas is a 64 y.o. female who presents for Med Refill.  HPI    63 yo female presents today for medication refills. She is known to this clinic for chronic low back pain. She is maintained on tramadol 50mg 2- 3 times daily, she confirms that the medication is still beneficial to her and lowers her overall pain level and improves quality of life. She is reporting 7/10 pain in low back with radiation to the right hip/buttock. This is different from her \"usual\" pain and started 2 weeks ago, the pain is improving. Worse with staying in one position for prolonged periods of time. The patient denies any side effects associated with the usage of the medication patient described the medication improving the quality of life and allowing participation in day-to-day activity patient denies otherwise any new complaint patient is requesting a refill. Patient denies usage of any other opiate. Patient denies usage of any recreational drugs. Patient confirms that the opiate prescription is being used as prescribed.       Review of Systems  All 13 systems were reviewed and are within normal levels except as noted below or per HPI. Positive and pertinent negative responses are noted below or in the HPI   Denied any fever or chills. No weight loss and no night sweats. No cough or sputum production. No diarrhea   Denies constipation.     No bladder and bowel incontinence and no other changes in bladder and bowel. No skin changes. Reports tiredness and fatigability only if the pain is not controlled.   Denied opioids diversion and abuse and denies alcoholism. Denies overuse of the pain medications.      Objective   Physical Exam  General   Alert and oriented x4, pleasant and cooperative.      HEENT  Pupils are equal and normal in size. Ears, nose, mouth, and throat appear to be WNL.  Head atraumatic, symmetric.      No signs of sedation or signs of withdrawal apparent.     Psychiatric   No signs of " depression apparent. Appropriate mood and affect.     Neuro   No focal neurological deficit apparent. Ambulation at baseline.      Respiratory  No respiratory distress, respirations equal and unlabored.     Abdomen  Soft and nontender, no distention noted.     Skin  Warm, dry and intact. No skin markings supportive of recent IV drug usage .            Assessment/Plan     65 yo female with chronic low back pain associated with lumbar spondylosis and lumbar radiculopathy.     I have personally reviewed the [place]ARRS report for this patient. I have considered the risks of abuse, dependence, addiction and diversion. I believe that it is clinically appropriate for this patient to be prescribed this medication based on documented diagnosis.  I believe the benefits of the continuation of the opiate outweighs the risk. Patient has described positive response to the present medication therapy. Patient denies any side effects associated with the usage of the medication. Patient did not elicit any signs supportive of misuse or abuse. The review of the [place] Automated Reporting prescription is not suggestive of any worrisome pattern. Patient believes the usage of this medication has improved the quality of life and allow him to participate in day-to-day activity. Therefore  I recommend the continuation of this medication and I will be refilling the medication prescription.    The patient was counseled regarding diagnostic results, instructions for management, risk factor reductions, prognosis, patient and family education, impressions, risks and benefits of treatment options and importance of compliance with treatment.     Continue to take tramadol as prescribed.     Last lumbar spine X-Ray in 2020. We will get new imaging. If pain persists we will send to physical therapy.     Follow up in 3 months or as needed.     Explained plan to this patient, and patient verbalized understanding and agreement with the plan.     Please do not  hesitate to contact the pain clinic after your visit with any questions or concerns at  M-F 8-4 pm     Patient was reminded not to share medications, not to take prescription medications that were not prescribed to the patient, and not to increase or change dose without consulting the pain clinic. I advised the patient to always take the least amount of medication needed to keep symptoms under control.          Kaylene Duran, MARIELY-CNP 12/20/24 11:57 AM

## 2024-12-20 NOTE — PROGRESS NOTES
Subjective   Portia Vargas is a 64 y.o. female who presents for evaluation of her back and right SI pain.    Here for medication refill.    Past Medical History:   Diagnosis Date    Chronic back pain     Osteoarthritis of hip, unspecified 03/26/2020    Osteoarthritis of hip     Past Surgical History:   Procedure Laterality Date    CERVICAL BIOPSY  W/ LOOP ELECTRODE EXCISION  2022    HGSIL    COLPOSCOPY      HYSTERECTOMY      LAPAROSCOPIC HYSTERECTOMY  2023    TLH/BSO - for persistent HGSIL    OTHER SURGICAL HISTORY  06/27/2020    Hip replacement    TOTAL HIP ARTHROPLASTY         I have reviewed the nurses notes and am aware of family/social history.     Review of Systems    Objective   Physical Exam    ASSESSMENT:     PLAN:   Discussed treatment plan with patient.   Call or return to clinic prn if these symptoms worsen or fail to improve as anticipated.     Neda Ng RN

## 2024-12-26 DIAGNOSIS — I10 ESSENTIAL (PRIMARY) HYPERTENSION: ICD-10-CM

## 2024-12-26 RX ORDER — AMLODIPINE BESYLATE 5 MG/1
5 TABLET ORAL DAILY
Qty: 90 TABLET | Refills: 1 | Status: SHIPPED | OUTPATIENT
Start: 2024-12-26

## 2024-12-26 RX ORDER — BENAZEPRIL HYDROCHLORIDE 40 MG/1
40 TABLET ORAL DAILY
Qty: 90 TABLET | Refills: 1 | Status: SHIPPED | OUTPATIENT
Start: 2024-12-26

## 2024-12-30 ENCOUNTER — HOSPITAL ENCOUNTER (OUTPATIENT)
Dept: RADIOLOGY | Facility: CLINIC | Age: 64
Discharge: HOME | End: 2024-12-30
Payer: COMMERCIAL

## 2024-12-30 ENCOUNTER — LAB (OUTPATIENT)
Dept: LAB | Facility: LAB | Age: 64
End: 2024-12-30
Payer: COMMERCIAL

## 2024-12-30 DIAGNOSIS — E55.9 VITAMIN D DEFICIENCY: ICD-10-CM

## 2024-12-30 DIAGNOSIS — E78.5 DYSLIPIDEMIA: ICD-10-CM

## 2024-12-30 LAB
25(OH)D3 SERPL-MCNC: 42 NG/ML (ref 30–100)
ALBUMIN SERPL BCP-MCNC: 4.5 G/DL (ref 3.4–5)
ALP SERPL-CCNC: 79 U/L (ref 33–136)
ALT SERPL W P-5'-P-CCNC: 12 U/L (ref 7–45)
ANION GAP SERPL CALC-SCNC: 8 MMOL/L (ref 10–20)
AST SERPL W P-5'-P-CCNC: 10 U/L (ref 9–39)
BILIRUB SERPL-MCNC: 0.6 MG/DL (ref 0–1.2)
BUN SERPL-MCNC: 13 MG/DL (ref 6–23)
CALCIUM SERPL-MCNC: 10.2 MG/DL (ref 8.6–10.3)
CHLORIDE SERPL-SCNC: 104 MMOL/L (ref 98–107)
CHOLEST SERPL-MCNC: 219 MG/DL (ref 0–199)
CHOLESTEROL/HDL RATIO: 3.5
CO2 SERPL-SCNC: 30 MMOL/L (ref 21–32)
CREAT SERPL-MCNC: 0.92 MG/DL (ref 0.5–1.05)
EGFRCR SERPLBLD CKD-EPI 2021: 71 ML/MIN/1.73M*2
GLUCOSE SERPL-MCNC: 96 MG/DL (ref 74–99)
HDLC SERPL-MCNC: 62.2 MG/DL
LDLC SERPL CALC-MCNC: 128 MG/DL
NON HDL CHOLESTEROL: 157 MG/DL (ref 0–149)
POTASSIUM SERPL-SCNC: 4.3 MMOL/L (ref 3.5–5.3)
PROT SERPL-MCNC: 6.8 G/DL (ref 6.4–8.2)
SODIUM SERPL-SCNC: 138 MMOL/L (ref 136–145)
TRIGL SERPL-MCNC: 146 MG/DL (ref 0–149)
VLDL: 29 MG/DL (ref 0–40)

## 2024-12-30 PROCEDURE — 80061 LIPID PANEL: CPT

## 2024-12-30 PROCEDURE — 72120 X-RAY BEND ONLY L-S SPINE: CPT

## 2024-12-30 PROCEDURE — 80053 COMPREHEN METABOLIC PANEL: CPT

## 2024-12-30 PROCEDURE — 72110 X-RAY EXAM L-2 SPINE 4/>VWS: CPT | Performed by: STUDENT IN AN ORGANIZED HEALTH CARE EDUCATION/TRAINING PROGRAM

## 2024-12-30 PROCEDURE — 82306 VITAMIN D 25 HYDROXY: CPT

## 2025-01-03 ENCOUNTER — DOCUMENTATION (OUTPATIENT)
Dept: PAIN MEDICINE | Facility: CLINIC | Age: 65
End: 2025-01-03
Payer: COMMERCIAL

## 2025-01-03 ENCOUNTER — TELEPHONE (OUTPATIENT)
Dept: PAIN MEDICINE | Facility: CLINIC | Age: 65
End: 2025-01-03
Payer: COMMERCIAL

## 2025-01-03 NOTE — PROGRESS NOTES
Spoke with patient on phone regarding lumbar spine XR results, we will schedule her for follow up appointment to discuss further imaging and treatment plan. She is reporting improvement in her low back pain since last FUV in December.

## 2025-01-03 NOTE — TELEPHONE ENCOUNTER
Result Communication    Resulted Orders   XR lumbar spine 4+ views w flexion extension    Narrative    Interpreted By:  Dayday Sutherland,   STUDY:  XR LUMBAR SPINE 4+ VIEWS WITH FLEXION EXTENSION; ;  12/30/2024 11:51  am      INDICATION:  Signs/Symptoms:Worsening pain.      ,M54.41 Lumbago with sciatica, right side,G89.29 Other chronic pain      COMPARISON:  06/24/2020      ACCESSION NUMBER(S):  [id number]      ORDERING CLINICIAN:  CARY CEBALLOS      FINDINGS:  Four views of the lumbar spine.      There is lumbar levocurvature and lower thoracic dextrocurvature.  There is no acute fracture. There is grade 1 L4-L5 anterolisthesis  and L3-L4 retrolisthesis. There is a superior endplate deformity of  L1 which is new from the prior examination. Moderate to severe L2-L3  discogenic degenerative change with moderate lower lumbar facet  hypertrophy. Otherwise mild multilevel degenerative change. No  dynamic instability.        Impression    Mildly compression deformity of L1 which was not present on the prior  exam dated 06/24/2020.      Moderate to severe multilevel spondylosis.      S-shaped thoracolumbar curvature      MACRO:  Critical Finding:  Fracture, unexpected, likely non-surgical.  Notification was initiated on 1/1/2025 at 11:53 am by  Dayday Sutherland.  (**-YCF-**)      Signed by: Dayday Sutherland 1/1/2025 11:53 AM  Dictation workstation:   [id number]       11:07 AM      Results were not successfully communicated with the patient and they did not acknowledge their understanding.    Attempted to reach patient, JOSE left. Will have her scheduled for follow up appointment to discuss results and treatment plan.

## 2025-01-13 ENCOUNTER — TELEPHONE (OUTPATIENT)
Dept: GASTROENTEROLOGY | Facility: EXTERNAL LOCATION | Age: 65
End: 2025-01-13
Payer: COMMERCIAL

## 2025-01-22 ENCOUNTER — APPOINTMENT (OUTPATIENT)
Dept: PAIN MEDICINE | Facility: CLINIC | Age: 65
End: 2025-01-22
Payer: COMMERCIAL

## 2025-02-03 ENCOUNTER — APPOINTMENT (OUTPATIENT)
Dept: PRIMARY CARE | Facility: CLINIC | Age: 65
End: 2025-02-03
Payer: COMMERCIAL

## 2025-02-03 VITALS
BODY MASS INDEX: 27.15 KG/M2 | TEMPERATURE: 98.1 F | DIASTOLIC BLOOD PRESSURE: 72 MMHG | OXYGEN SATURATION: 99 % | SYSTOLIC BLOOD PRESSURE: 128 MMHG | RESPIRATION RATE: 19 BRPM | WEIGHT: 173 LBS | HEART RATE: 69 BPM | HEIGHT: 67 IN

## 2025-02-03 DIAGNOSIS — N20.0 RENAL LITHIASIS: ICD-10-CM

## 2025-02-03 DIAGNOSIS — M47.816 LUMBAR SPONDYLOSIS: ICD-10-CM

## 2025-02-03 DIAGNOSIS — I10 ESSENTIAL (PRIMARY) HYPERTENSION: Primary | ICD-10-CM

## 2025-02-03 DIAGNOSIS — E04.9 ENLARGED THYROID: ICD-10-CM

## 2025-02-03 PROCEDURE — 99214 OFFICE O/P EST MOD 30 MIN: CPT | Performed by: FAMILY MEDICINE

## 2025-02-03 RX ORDER — DICLOFENAC SODIUM 10 MG/G
2 GEL TOPICAL 2 TIMES DAILY
Qty: 100 G | Refills: 1 | Status: SHIPPED | OUTPATIENT
Start: 2025-02-03

## 2025-02-03 RX ORDER — AMLODIPINE BESYLATE 5 MG/1
5 TABLET ORAL DAILY
Qty: 90 TABLET | Refills: 1 | Status: SHIPPED | OUTPATIENT
Start: 2025-02-03

## 2025-02-03 RX ORDER — TAMSULOSIN HYDROCHLORIDE 0.4 MG/1
0.4 CAPSULE ORAL DAILY
Qty: 90 CAPSULE | Refills: 1 | Status: SHIPPED | OUTPATIENT
Start: 2025-02-03 | End: 2026-02-03

## 2025-02-03 RX ORDER — BENAZEPRIL HYDROCHLORIDE 40 MG/1
40 TABLET ORAL DAILY
Qty: 90 TABLET | Refills: 1 | Status: SHIPPED | OUTPATIENT
Start: 2025-02-03

## 2025-02-03 RX ORDER — BISOPROLOL FUMARATE AND HYDROCHLOROTHIAZIDE 5; 6.25 MG/1; MG/1
1 TABLET ORAL DAILY
Qty: 90 TABLET | Refills: 1 | Status: SHIPPED | OUTPATIENT
Start: 2025-02-03

## 2025-02-03 ASSESSMENT — ENCOUNTER SYMPTOMS
DEPRESSION: 0
SINUS PRESSURE: 0
SLEEP DISTURBANCE: 0
CONFUSION: 0
APPETITE CHANGE: 0
BLOOD IN STOOL: 0
HYPERTENSION: 1
BACK PAIN: 1
HEMATURIA: 0
CHEST TIGHTNESS: 0
OCCASIONAL FEELINGS OF UNSTEADINESS: 0
FATIGUE: 0
ACTIVITY CHANGE: 0
ABDOMINAL PAIN: 0
POLYDIPSIA: 0
NERVOUS/ANXIOUS: 0
AGITATION: 0
SINUS PAIN: 0
SORE THROAT: 0
RECTAL PAIN: 0
ADENOPATHY: 0
EYE PAIN: 0
SEIZURES: 0
POLYPHAGIA: 0
FEVER: 0
PHOTOPHOBIA: 0
PALPITATIONS: 0
DIZZINESS: 0
LOSS OF SENSATION IN FEET: 0
HEADACHES: 0
DIARRHEA: 0
SPEECH DIFFICULTY: 0
DYSPHORIC MOOD: 0
SHORTNESS OF BREATH: 0
COUGH: 0
STRIDOR: 0
COLOR CHANGE: 0
CONSTITUTIONAL NEGATIVE: 1
RHINORRHEA: 0
DECREASED CONCENTRATION: 0
ABDOMINAL DISTENTION: 0
NECK STIFFNESS: 0
TROUBLE SWALLOWING: 0
FLANK PAIN: 0
DYSURIA: 0
CONSTIPATION: 0

## 2025-02-03 NOTE — PROGRESS NOTES
"Subjective   Patient ID: Portia Vargas is a 64 y.o. female who presents for Hypertension.    Patient had done blood work on 12/30/2024  Patient had done XR lumbar spine on 12/30/2025    Patient would like to know if there is anything she can do to increase bone density. Considering going to a gym that offers \"osteo\" training.    Patient denies any other symptoms or concerns today.      Hypertension  This is a recurrent problem. The current episode started more than 1 year ago. The problem is unchanged. Pertinent negatives include no chest pain, headaches, palpitations or shortness of breath. There are no associated agents to hypertension. There are no known risk factors for coronary artery disease.        Review of Systems   Constitutional: Negative.  Negative for activity change, appetite change, fatigue and fever.   HENT:  Negative for congestion, dental problem, ear discharge, ear pain, mouth sores, rhinorrhea, sinus pressure, sinus pain, sore throat, tinnitus and trouble swallowing.    Eyes:  Negative for photophobia, pain and visual disturbance.   Respiratory:  Negative for cough, chest tightness, shortness of breath and stridor.    Cardiovascular:  Negative for chest pain and palpitations.   Gastrointestinal:  Negative for abdominal distention, abdominal pain, blood in stool, constipation, diarrhea and rectal pain.   Endocrine: Negative for cold intolerance, heat intolerance, polydipsia, polyphagia and polyuria.   Genitourinary:  Negative for dysuria, flank pain, hematuria and urgency.   Musculoskeletal:  Positive for arthralgias, back pain and myalgias. Negative for gait problem and neck stiffness.   Skin:  Negative for color change and rash.   Allergic/Immunologic: Negative for environmental allergies and food allergies.   Neurological:  Negative for dizziness, seizures, syncope, speech difficulty and headaches.   Hematological:  Negative for adenopathy.   Psychiatric/Behavioral:  Negative for " "agitation, confusion, decreased concentration, dysphoric mood and sleep disturbance. The patient is not nervous/anxious.        Objective   /72 (BP Location: Right arm, Patient Position: Sitting, BP Cuff Size: Adult)   Pulse 69   Temp 36.7 °C (98.1 °F) (Skin)   Resp 19   Ht 1.702 m (5' 7\")   Wt 78.5 kg (173 lb)   SpO2 99%   BMI 27.10 kg/m²     Physical Exam  Vitals reviewed.   Constitutional:       General: She is not in acute distress.     Appearance: Normal appearance. She is normal weight. She is not ill-appearing or diaphoretic.   HENT:      Head: Normocephalic.      Right Ear: Tympanic membrane and external ear normal.      Left Ear: Tympanic membrane and external ear normal.      Nose: Nose normal. No congestion.      Mouth/Throat:      Pharynx: No posterior oropharyngeal erythema.   Eyes:      General:         Right eye: No discharge.         Left eye: No discharge.      Extraocular Movements: Extraocular movements intact.      Conjunctiva/sclera: Conjunctivae normal.      Pupils: Pupils are equal, round, and reactive to light.   Cardiovascular:      Rate and Rhythm: Normal rate and regular rhythm.      Pulses: Normal pulses.      Heart sounds: Normal heart sounds. No murmur heard.  Pulmonary:      Effort: Pulmonary effort is normal. No respiratory distress.      Breath sounds: Normal breath sounds. No wheezing or rales.   Chest:      Chest wall: No tenderness.   Abdominal:      General: Abdomen is flat. Bowel sounds are normal. There is no distension.      Palpations: There is no mass.      Tenderness: There is no abdominal tenderness. There is no guarding.   Musculoskeletal:         General: No tenderness. Normal range of motion.      Cervical back: Normal range of motion and neck supple. No tenderness.      Right lower leg: No edema.      Left lower leg: No edema.   Skin:     General: Skin is dry.      Coloration: Skin is not jaundiced.      Findings: No bruising, erythema or rash. "   Neurological:      General: No focal deficit present.      Mental Status: She is alert and oriented to person, place, and time. Mental status is at baseline.      Cranial Nerves: No cranial nerve deficit.      Sensory: No sensory deficit.      Coordination: Coordination normal.      Gait: Gait normal.   Psychiatric:         Mood and Affect: Mood normal.         Thought Content: Thought content normal.         Judgment: Judgment normal.         Assessment/Plan   Problem List Items Addressed This Visit             ICD-10-CM    Lumbar spondylosis M47.816    Relevant Medications    diclofenac sodium (Voltaren) 1 % gel     Other Visit Diagnoses         Codes    Essential (primary) hypertension    -  Primary I10    Relevant Medications    amLODIPine (Norvasc) 5 mg tablet    bisoproloL-hydrochlorothiazide (Ziac) 5-6.25 mg tablet    benazepril (Lotensin) 40 mg tablet    Renal lithiasis     N20.0    Relevant Medications    tamsulosin (Flomax) 0.4 mg 24 hr capsule    Enlarged thyroid     E04.9    Relevant Orders    US thyroid          Scribe Attestation  By signing my name below, I, Nita Mccord MA , Scribe   attest that this documentation has been prepared under the direction and in the presence of Shiraz Maldonado DO.    Provider Attestation - Scribe documentation    All medical record entries made by the Scribe were at my direction and personally dictated by me. I have reviewed the chart and agree that the record accurately reflects my personal performance of the history, physical exam, discussion and plan.

## 2025-02-03 NOTE — PATIENT INSTRUCTIONS
Follow up in 6 months    Continue current medications and therapy for chronic medical conditions.    Patient was advised importance of proper diet/nutrition in addition adequate hydration. Patient was encouraged moderate exercise program to include 30 minutes daily for 5 days of the week or 150 minutes weekly. Patient will follow-up with us as scheduled.    Review lab results from December 2024    Review x-rays of LS spine from December 2024    US thyroid ordered

## 2025-02-09 ASSESSMENT — ENCOUNTER SYMPTOMS
ARTHRALGIAS: 1
MYALGIAS: 1

## 2025-02-13 ENCOUNTER — APPOINTMENT (OUTPATIENT)
Dept: RADIOLOGY | Facility: CLINIC | Age: 65
End: 2025-02-13
Payer: COMMERCIAL

## 2025-03-18 ENCOUNTER — TELEPHONE (OUTPATIENT)
Dept: PAIN MEDICINE | Facility: CLINIC | Age: 65
End: 2025-03-18
Payer: COMMERCIAL

## 2025-03-21 ENCOUNTER — OFFICE VISIT (OUTPATIENT)
Dept: PAIN MEDICINE | Facility: CLINIC | Age: 65
End: 2025-03-21
Payer: COMMERCIAL

## 2025-03-21 VITALS
RESPIRATION RATE: 20 BRPM | DIASTOLIC BLOOD PRESSURE: 78 MMHG | OXYGEN SATURATION: 96 % | SYSTOLIC BLOOD PRESSURE: 138 MMHG | HEART RATE: 62 BPM

## 2025-03-21 DIAGNOSIS — M54.50 CHRONIC BILATERAL LOW BACK PAIN, UNSPECIFIED WHETHER SCIATICA PRESENT: ICD-10-CM

## 2025-03-21 DIAGNOSIS — G89.29 CHRONIC BILATERAL LOW BACK PAIN, UNSPECIFIED WHETHER SCIATICA PRESENT: ICD-10-CM

## 2025-03-21 PROCEDURE — 3075F SYST BP GE 130 - 139MM HG: CPT

## 2025-03-21 PROCEDURE — 99213 OFFICE O/P EST LOW 20 MIN: CPT

## 2025-03-21 PROCEDURE — 3078F DIAST BP <80 MM HG: CPT

## 2025-03-21 RX ORDER — TRAMADOL HYDROCHLORIDE 50 MG/1
50 TABLET ORAL 3 TIMES DAILY PRN
Qty: 90 TABLET | Refills: 2 | Status: SHIPPED | OUTPATIENT
Start: 2025-03-21 | End: 2025-06-19

## 2025-03-21 ASSESSMENT — PAIN - FUNCTIONAL ASSESSMENT: PAIN_FUNCTIONAL_ASSESSMENT: 0-10

## 2025-03-21 ASSESSMENT — PAIN SCALES - GENERAL
PAINLEVEL_OUTOF10: 5
PAINLEVEL_OUTOF10: 5 - MODERATE PAIN

## 2025-03-21 ASSESSMENT — PAIN DESCRIPTION - DESCRIPTORS: DESCRIPTORS: ACHING

## 2025-03-21 NOTE — PROGRESS NOTES
Subjective   Portia Vargas is a 64 y.o. female who presents for evaluation of her back pain. The pt is here fore her medication refill at this time.    Past Medical History:   Diagnosis Date    Chronic back pain     Osteoarthritis of hip, unspecified 03/26/2020    Osteoarthritis of hip     Past Surgical History:   Procedure Laterality Date    CERVICAL BIOPSY  W/ LOOP ELECTRODE EXCISION  2022    HGSIL    COLPOSCOPY      HYSTERECTOMY      LAPAROSCOPIC HYSTERECTOMY  2023    TLH/BSO - for persistent HGSIL    OTHER SURGICAL HISTORY  06/27/2020    Hip replacement    TOTAL HIP ARTHROPLASTY         I have reviewed the nurses notes and am aware of family/social history.     Review of Systems    Objective   Physical Exam    ASSESSMENT:     PLAN:   Discussed treatment plan with patient.   Call or return to clinic prn if these symptoms worsen or fail to improve as anticipated.     Neda Ng RN

## 2025-03-21 NOTE — PROGRESS NOTES
"Subjective   Patient ID: Portia Vargas is a 64 y.o. female who presents for Med Refill.  HPI    63 yo female presents today for medication refills. She is known to this clinic for chronic low back pain. She is maintained on tramadol 50mg three times daily. She confirms that the medication is still effective and improves her quality of life. Today her pain is rated 5/10 in the low back. In December we did lumbar X-Rays after she reported new, severe low back pain. X-Ray showed L1 compression deformity, patient was advised to follow up in clinic. She reports that her low back pain has greatly improved since her last appointment and that the \"new\" pain she experienced in December is gone. The patient denies any side effects associated with the usage of the medication patient described the medication improving the quality of life and allowing participation in day-to-day activity patient denies otherwise any new complaint patient is requesting a refill. Patient denies usage of any other opiate. Patient denies usage of any recreational drugs. Patient confirms that the opiate prescription is being used as prescribed.     Review of Systems  All 13 systems were reviewed and are within normal levels except as noted below or per HPI. Positive and pertinent negative responses are noted below or in the HPI   Denied any fever or chills. No weight loss and no night sweats. No cough or sputum production. No diarrhea   Denies constipation.   No bladder and bowel incontinence and no other changes in bladder and bowel. No skin changes. Reports tiredness and fatigability only if the pain is not controlled.   Denied opioids diversion and abuse and denies alcoholism. Denies overuse of the pain medications.      Objective   Physical Exam  General   Alert and oriented x4, pleasant and cooperative.      HEENT  Pupils are equal and normal in size. Ears, nose, mouth, and throat appear to be WNL.  Head atraumatic, symmetric.      No " signs of sedation or signs of withdrawal apparent.     Psychiatric   No signs of depression apparent. Appropriate mood and affect.     Neuro   No focal neurological deficit apparent. Ambulation at baseline.      Respiratory  No respiratory distress, respirations equal and unlabored.     Abdomen  Soft and nontender, no distention noted.     Skin  Warm, dry and intact. No skin markings supportive of recent IV drug usage .            Assessment/Plan     63 yo female with chronic low back pain associated with lumbar spondylosis, lumbar radiculopathy. Compression fracture of L1.     [facility]. I have considered the risks of abuse, dependence, addiction and diversion. I believe that it is clinically appropriate for this patient to be prescribed this medication based on documented diagnosis.  I believe the benefits of the continuation of the opiate outweighs the risk. Patient has described positive response to the present medication therapy. Patient denies any side effects associated with the usage of the medication. Patient did not elicit any signs supportive of misuse or abuse. The review of the [place] [employer] prescription is not suggestive of any worrisome pattern. Patient believes the usage of this medication has improved the quality of life and allow him to participate in day-to-day activity. Therefore  I recommend the continuation of this medication and I will be refilling the medication prescription.    The patient was counseled regarding diagnostic results, instructions for management, risk factor reductions, prognosis, patient and family education, impressions, risks and benefits of treatment options and importance of compliance with treatment.    Continue to take tramadol 50mg three times daily as prescribed.     Follow up in 3 months or as needed.     Explained plan to this patient, and patient verbalized understanding and agreement with the plan.      Please do not hesitate to contact the pain clinic after your visit with any questions or concerns at  M-F 8-4 pm     Patient was reminded not to share medications, not to take prescription medications that were not prescribed to the patient, and not to increase or change dose without consulting the pain clinic. I advised the patient to always take the least amount of medication needed to keep symptoms under control.          Kaylene Duran, MARIELY-CNP 03/21/25 2:47 PM

## 2025-05-05 ENCOUNTER — APPOINTMENT (OUTPATIENT)
Dept: RADIOLOGY | Facility: CLINIC | Age: 65
End: 2025-05-05
Payer: COMMERCIAL

## 2025-05-05 DIAGNOSIS — E04.9 ENLARGED THYROID: ICD-10-CM

## 2025-05-05 PROCEDURE — 76536 US EXAM OF HEAD AND NECK: CPT | Performed by: RADIOLOGY

## 2025-05-05 PROCEDURE — 76536 US EXAM OF HEAD AND NECK: CPT

## 2025-05-09 ENCOUNTER — APPOINTMENT (OUTPATIENT)
Dept: PRIMARY CARE | Facility: CLINIC | Age: 65
End: 2025-05-09
Payer: COMMERCIAL

## 2025-05-09 VITALS
OXYGEN SATURATION: 98 % | HEIGHT: 67 IN | DIASTOLIC BLOOD PRESSURE: 90 MMHG | SYSTOLIC BLOOD PRESSURE: 122 MMHG | TEMPERATURE: 97 F | BODY MASS INDEX: 27.12 KG/M2 | WEIGHT: 172.8 LBS | RESPIRATION RATE: 16 BRPM | HEART RATE: 66 BPM

## 2025-05-09 DIAGNOSIS — Z12.31 ENCOUNTER FOR SCREENING MAMMOGRAM FOR BREAST CANCER: ICD-10-CM

## 2025-05-09 DIAGNOSIS — I10 BENIGN ESSENTIAL HYPERTENSION: ICD-10-CM

## 2025-05-09 DIAGNOSIS — M54.16 LUMBAR RADICULOPATHY: ICD-10-CM

## 2025-05-09 DIAGNOSIS — E04.2 MULTIPLE THYROID NODULES: ICD-10-CM

## 2025-05-09 DIAGNOSIS — G56.03 BILATERAL CARPAL TUNNEL SYNDROME: ICD-10-CM

## 2025-05-09 DIAGNOSIS — J30.1 SEASONAL ALLERGIC RHINITIS DUE TO POLLEN: ICD-10-CM

## 2025-05-09 DIAGNOSIS — N20.0 RENAL LITHIASIS: Primary | ICD-10-CM

## 2025-05-09 LAB
POC APPEARANCE, URINE: CLEAR
POC BILIRUBIN, URINE: NEGATIVE
POC BLOOD, URINE: ABNORMAL
POC COLOR, URINE: YELLOW
POC GLUCOSE, URINE: NEGATIVE MG/DL
POC KETONES, URINE: NEGATIVE MG/DL
POC LEUKOCYTES, URINE: NEGATIVE
POC NITRITE,URINE: NEGATIVE
POC PH, URINE: 6.5 PH
POC PROTEIN, URINE: ABNORMAL MG/DL
POC SPECIFIC GRAVITY, URINE: >=1.03
POC UROBILINOGEN, URINE: 0.2 EU/DL

## 2025-05-09 PROCEDURE — 81002 URINALYSIS NONAUTO W/O SCOPE: CPT | Performed by: FAMILY MEDICINE

## 2025-05-09 PROCEDURE — 99214 OFFICE O/P EST MOD 30 MIN: CPT | Performed by: FAMILY MEDICINE

## 2025-05-09 RX ORDER — CETIRIZINE HYDROCHLORIDE 10 MG/1
10 TABLET ORAL NIGHTLY
Qty: 30 TABLET | Refills: 2 | Status: SHIPPED | OUTPATIENT
Start: 2025-05-09 | End: 2025-08-07

## 2025-05-18 NOTE — PATIENT INSTRUCTIONS
VISIT SUMMARY:  Today, you came in for a follow-up visit to discuss several health concerns, including wrist pain, blood pressure, allergies, thyroid nodules, and a possible kidney stone. We reviewed your symptoms, recent lab results, and discussed a plan for each issue.    YOUR PLAN:  -WRIST PAIN AND NUMBNESS: Your wrist pain and numbness are likely due to repetitive motion at work. We will conduct an EMG test to check for carpal tunnel syndrome or a pinched nerve.    -ESSENTIAL (PRIMARY) HYPERTENSION: Your blood pressure is slightly elevated. We discussed that some allergy medications can raise blood pressure, but pure antihistamines should not. Continue taking your current blood pressure medications and monitor your blood pressure regularly.    -ALLERGIC RHINITIS: Your seasonal allergies may be causing shortness of breath and affecting your blood pressure. We prescribed Zyrtec 10 mg to take at bedtime and will monitor your symptoms and blood pressure.    -THYROID NODULES, TIRADS 4: You have thyroid nodules that are suspicious and need monitoring. We will perform a thyroid scan with a radioisotope injection and schedule a follow-up ultrasound in one year. If the nodule grows to 2 cm, a fine needle aspiration may be needed.    -KIDNEY STONE: You have a history of kidney stones and are concerned about a current stone. We will perform a urinalysis to check for the presence of a kidney stone.    INSTRUCTIONS:  1. Schedule an EMG test to evaluate your wrist pain and numbness.  2. Continue taking your current blood pressure medications and monitor your blood pressure regularly.  3. Take Zyrtec 10 mg at bedtime for your allergies and monitor your symptoms and blood pressure.  4. Schedule a thyroid scan with a radioisotope injection.  5. Schedule a follow-up ultrasound for your thyroid nodules in one year.  6. Provide a urine sample for urinalysis to check for a kidney stone.

## 2025-05-18 NOTE — PROGRESS NOTES
Subjective   Patient ID: Portia Vargas is a 64 y.o. female who presents for Follow-up (Pt presents today wondering if her kidney stones are gone. Pt has not been feeling like herself she states she feels her heart beating faster and she's not sure why.) and Results (Ultrasound of the thyroid from 5/5/25).  History of Present Illness  Portia Vargas is a 64 year old female who presents for follow-up and evaluation of her current symptoms.    She has a history of kidney stones and is concerned about the possibility of a current stone. She previously took tamsulosin but discontinued it due to adverse effects. She is willing to provide a urine sample for further evaluation.    She has thyroid nodules, with a recent ultrasound revealing a 1.4 cm solid hypoechoic nodule in the inferior pole of the right thyroid lobe, classified as TI-RADS 4. A follow-up ultrasound is recommended in one year. She has not previously consulted a specialist for this issue. There is a possible family history of thyroid issues treated with radioactive iodine, though details are unclear.    She experiences wrist pain, described as 'pins and needles', likely due to repetitive motion at her job at the [employer]. She occasionally uses diclofenac gel for relief. The pain is more pronounced on the right side. She has not undergone an EMG to evaluate for carpal tunnel syndrome or a pinched nerve.    She reports shortness of breath during physical activity, attributing it to allergies. She does not take allergy medications due to concerns about blood pressure. She experiences morning headaches that improve throughout the day.    Her recent lab results show a vitamin D level of 42, total cholesterol of 219, HDL of 62.2, LDL of 128, and triglycerides of 146. She is not currently taking medication for cholesterol. She takes bisoprolol 5/6.25 mg, vitamin B12 over the counter, and occasionally uses Advil for pain. She does not take Robaxin  "or Tylenol. She uses benazepril 40 mg, usually twice a day, sometimes three times.    Review of Systems   Constitutional: Negative.  Negative for activity change, appetite change, fatigue and fever.   HENT:  Negative for congestion, dental problem, ear discharge, ear pain, mouth sores, rhinorrhea, sinus pressure, sinus pain, sore throat, tinnitus and trouble swallowing.    Eyes:  Negative for photophobia, pain and visual disturbance.   Respiratory:  Negative for cough, chest tightness, shortness of breath and stridor.    Cardiovascular:  Negative for chest pain and palpitations.   Gastrointestinal:  Negative for abdominal distention, abdominal pain, blood in stool, constipation, diarrhea and rectal pain.   Endocrine: Negative for cold intolerance, heat intolerance, polydipsia, polyphagia and polyuria.   Genitourinary:  Negative for dysuria, flank pain, hematuria and urgency.   Musculoskeletal:  Negative for arthralgias, gait problem, myalgias and neck stiffness.   Skin:  Negative for color change and rash.   Allergic/Immunologic: Negative for environmental allergies and food allergies.   Neurological:  Negative for dizziness, seizures, syncope, speech difficulty and headaches.   Hematological:  Negative for adenopathy.   Psychiatric/Behavioral:  Negative for agitation, confusion, decreased concentration, dysphoric mood and sleep disturbance. The patient is not nervous/anxious.    The above were reviewed and noted negative except as noted in HPI and Problem List.    Objective     /90 (BP Location: Right arm, Patient Position: Sitting, BP Cuff Size: Adult)   Pulse 66   Temp 36.1 °C (97 °F) (Temporal)   Resp 16   Ht 1.702 m (5' 7\")   Wt 78.4 kg (172 lb 12.8 oz)   SpO2 98%   BMI 27.06 kg/m²      Physical Exam  VITALS: BP- 122/90  GENERAL: Alert, cooperative, well developed, no acute distress  HEENT: Normocephalic, normal oropharynx, moist mucous membranes  CHEST: Clear to auscultation bilaterally, no " wheezes, rhonchi, or crackles  CARDIOVASCULAR: Normal heart rate and rhythm, S1 and S2 normal without murmurs  ABDOMEN: Soft, non-tender, non-distended, without organomegaly, normal bowel sounds  EXTREMITIES: No cyanosis or edema  NEUROLOGICAL: Cranial nerves grossly intact, moves all extremities without gross motor or sensory deficit  Constitutional: Well developed, well nourished, alert and in no acute distress   Eyes: Normal external exam. Pupils equally round and reactive to light with normal accommodation and extraocular movements intact.  Neck: Supple, no lymphadenopathy or masses.   Cardiovascular: Regular rate and rhythm, normal S1 and S2, no murmurs, gallops, or rubs. Radial pulses normal. No peripheral edema.  Pulmonary: No respiratory distress, lungs clear to auscultation bilaterally. No wheezes, rhonchi, rales.  Abdomen: soft,non tender, non distended, without masses or HSM  Skin: Warm, well perfused, normal skin turgor and color.   Neurologic: Cranial nerves II-XII grossly intact.   Psychiatric: Mood calm and affect normal  Musculoskeletal: Moving all extremities without restriction  The above were reviewed and noted negative except as noted in HPI and Problem List.    Problem List Items Addressed This Visit       Benign essential hypertension    Lumbar radiculopathy     Other Visit Diagnoses         Renal lithiasis    -  Primary    Relevant Orders    POCT UA (nonautomated) manually resulted (Completed)      Encounter for screening mammogram for breast cancer        Relevant Orders    BI mammo bilateral screening tomosynthesis      Multiple thyroid nodules        Relevant Orders    NM thyroid uptake and scan      Bilateral carpal tunnel syndrome        Relevant Orders    EMG & nerve conduction      Seasonal allergic rhinitis due to pollen        Relevant Medications    cetirizine (ZyrTEC) 10 mg tablet             Assessment & Plan  Wrist pain and numbness  Wrist pain and numbness likely due to  repetitive motion at work, with symptoms extending to numbness in the arms and pain in the shoulders. No prior EMG testing has been conducted.  - Order EMG to evaluate for carpal tunnel syndrome or pinched nerve.    Essential (primary) hypertension  Blood pressure is borderline at 122/90, potentially influenced by allergies and weather changes. Discussed that pseudoephedrine in decongestants can elevate blood pressure, while pure antihistamines like Claritin, Zyrtec, or Allegra should not.  - Continue current antihypertensive medications: Amlodipine, Bisoprolol-hydrochlorothiazide, Benazepril.  - Monitor blood pressure regularly.    Allergic rhinitis  Seasonal allergies may contribute to shortness of breath and elevated blood pressure. Concerned about allergy medications affecting blood pressure. Discussed that pure antihistamines should not have this effect.  - Prescribe Zyrtec 10 mg at bedtime.  - Monitor symptoms and blood pressure.    Thyroid nodules, TIRADS 4  Ultrasound reveals a 1.4 cm solid hypoechoic nodule in the right thyroid and a 1 cm nodule in the left thyroid, both TIRADS 4. These nodules are suspicious and require monitoring. Discussed the option of a thyroid scan to determine if the nodules are hot or cold. If the nodule grows to 2 cm, a fine needle aspiration may be performed.  - Order thyroid scan with radioisotope injection.  - Schedule follow-up ultrasound in one year.    Kidney stone  Previously took Tamsulosin for kidney stones but discontinued due to side effects. Uncertain if the kidney stone is still present.  - Perform urinalysis to check for kidney stone presence.    Results  LABS  Vitamin D: 42 (12/30/2024)  Total Cholesterol: 219 (12/30/2024)  HDL: 62.2 (12/30/2024)  LDL: 128 (12/30/2024)  Triglycerides: 146 (12/30/2024)    RADIOLOGY  Thyroid Ultrasound: Right thyroid gland: 2.3 x 2.6 x 7.4 cm, non-suspicious nodule within the superior pole, 1.4 cm solid hypoechoic nodule in the  inferior pole, TI-RADS 4. Left thyroid gland: 1 cm solid hypoechoic nodule, TI-RADS 4.       Shiraz Maldonado,      This medical note was created with the assistance of artificial intelligence (AI) for documentation purposes. The content has been reviewed and confirmed by the healthcare provider for accuracy and completeness. Patient consented to the use of audio recording and use of AI during their visit.

## 2025-06-16 NOTE — PROGRESS NOTES
Patient ID: Portia Vargas is a 64 y.o. female.  Primary Care Provider: Shiraz Maldonado,     Subjective    HPI:  referred by Dr. Manrique     62 with cervical dysplasia     Dysplasia History:  -  PAP ASC-H, colposcopy wnl  -  PAP was LGSIL, colposcopy wnl  - 2022 underwent a LEEP for a high grade PAP, LEEP pathology consistent with high grade dysplasia with positive ectocervical margins  - 10/2022 repeat PAP was suspicious for cervical cancer.  - 2023 Dr. Manrique did colposcopy biopsies of the cervix at 6 and 9 o'clock were positive for high grade squamous intra epithelial lesion/CIN3 without obvious evidence of invasive carcinoma. Of note Dr. Manrique's exam notable for a very scant amount of remaining  cervix for biopsy. ECC also was positive for high grade squamous intra epithelial lesion.  23:  TLH, BSO, cystoscopy- CIN3 with positive margins     Medical History:  - Arthritis  - Hypertension     Surgical History:  - Bilateral hip replacements  - LEEP procedure     Social History:  - Current smoker, smokes 1 cigarette per day, drinks alcohol occasionally, does not use illicit drugs.  - Lives with  and son, works at a post [employer].     Obstetrics/Gynecology History:  -  menopausal since   - 2 vaginal deliveries.  remote h/o abnormal pap smears and cryo, and cone biopsy in the past     Family History:  - Mother had breast cancer and passed at 63yo, father had lung cancer      Objective    Visit Vitals  /66 (BP Location: Right arm, Patient Position: Sitting, BP Cuff Size: Adult)   Pulse 64   Temp 36.1 °C (97 °F) (Temporal)   Resp 16        Interval history:  Patient is a 64 year old female with history of abnormal paps s/p TLH, BSO 2023.      Pap 5/15/24:   Low grade squamous intraepithelial lesion (LSIL) - Vagina  Atypical squamous cells cannot exclude high grade squamous intraepithelial lesion (ASC-H) - Vagina    Had colposcopy:  A. VAGINAL CUFF BIOPSY:   -- SQUAMOUS MUCOSA, NO  PATHOLOGIC DIAGNOSIS    Here for annual exam.   Patient was using Estrace until vaginal bleeding stopped.  Patient denies any bowel issues.  Patient states she has some urinary retention, has to sit and concentrate to empty bladder.  Patient mammogram is scheduled.  Patient is currently sexually active, denies dyspareunia or vaginal dryness.      Physical Exam:    Constitutional: Doing well. LIBORIO  Eyes: PERRL  ENMT: Moist mucus membranes  Head/Neck: Supple. Symmetrical  Cardiovascular: Regular, rate and rhythm. 2+ equal pulses of the extremities  Respiratory/Thorax: CTA. RRR. Chest rise symmetrical.  Gastrointestinal: Non-distended, soft, non-tender  Genitourinary:   Normal external female genitalia. No vulvar lesions noted  Speculum exam: Smooth vaginal walls without lesions or masses. Vaginal cuff visualized without lesions. Surgically absent cervix.  Bimanual exam: Smooth vaginal wall without lesions or masses.  Surgically absent uterus, cervix, and adnexa.  Musculoskeletal: ROM intact, no joint swelling, normal strength  Extremities: No edema  Neurological: Alert and oriented x 3. Pleasant and cooperative.  Lymphatic: No lymphadenopathy. No lymphedema  Psychological: Appropriate mood and behavior  Skin: Warm and dry, no lesions, no rashes    A complete review of systems was performed and all systems were normal except what is noted in the interval history.         Assessment/Plan  Patient is a 64 year old female with history of abnormal paps s/p [facility], BSO 9/2023.  Well woman exam.        PLAN:  Pap today, F/U results  If stable changes F/U in 1 year or as needed  RX for Estrace sent, intravaginally three nights a week, using dime to nickel size amount on finger and applying at specified area.  Do not use applicator.

## 2025-06-17 ENCOUNTER — APPOINTMENT (OUTPATIENT)
Dept: RADIOLOGY | Facility: HOSPITAL | Age: 65
End: 2025-06-17
Payer: COMMERCIAL

## 2025-06-18 ENCOUNTER — APPOINTMENT (OUTPATIENT)
Dept: RADIOLOGY | Facility: HOSPITAL | Age: 65
End: 2025-06-18
Payer: COMMERCIAL

## 2025-06-18 ENCOUNTER — OFFICE VISIT (OUTPATIENT)
Dept: GYNECOLOGIC ONCOLOGY | Facility: CLINIC | Age: 65
End: 2025-06-18
Payer: COMMERCIAL

## 2025-06-18 VITALS
WEIGHT: 170.86 LBS | BODY MASS INDEX: 26.76 KG/M2 | SYSTOLIC BLOOD PRESSURE: 128 MMHG | DIASTOLIC BLOOD PRESSURE: 66 MMHG | HEART RATE: 64 BPM | RESPIRATION RATE: 16 BRPM | TEMPERATURE: 97 F | OXYGEN SATURATION: 98 %

## 2025-06-18 DIAGNOSIS — R87.629 ABNORMAL VAGINAL PAP SMEAR: Primary | ICD-10-CM

## 2025-06-18 DIAGNOSIS — N95.2 VAGINAL ATROPHY: ICD-10-CM

## 2025-06-18 PROCEDURE — 3074F SYST BP LT 130 MM HG: CPT | Performed by: NURSE PRACTITIONER

## 2025-06-18 PROCEDURE — 99214 OFFICE O/P EST MOD 30 MIN: CPT | Performed by: NURSE PRACTITIONER

## 2025-06-18 PROCEDURE — 87626 HPV SEP HI-RSK TYP&POOL RSLT: CPT | Performed by: NURSE PRACTITIONER

## 2025-06-18 PROCEDURE — 4004F PT TOBACCO SCREEN RCVD TLK: CPT | Performed by: NURSE PRACTITIONER

## 2025-06-18 PROCEDURE — 3078F DIAST BP <80 MM HG: CPT | Performed by: NURSE PRACTITIONER

## 2025-06-18 RX ORDER — ESTRADIOL 0.1 MG/G
CREAM VAGINAL
Qty: 42.5 G | Refills: 3 | Status: SHIPPED | OUTPATIENT
Start: 2025-06-18

## 2025-06-18 ASSESSMENT — PAIN SCALES - GENERAL: PAINLEVEL_OUTOF10: 0-NO PAIN

## 2025-06-19 ENCOUNTER — TELEPHONE (OUTPATIENT)
Dept: PAIN MEDICINE | Facility: CLINIC | Age: 65
End: 2025-06-19
Payer: COMMERCIAL

## 2025-07-02 ENCOUNTER — HOSPITAL ENCOUNTER (OUTPATIENT)
Dept: RADIOLOGY | Facility: CLINIC | Age: 65
Discharge: HOME | End: 2025-07-02
Payer: COMMERCIAL

## 2025-07-02 DIAGNOSIS — Z12.31 ENCOUNTER FOR SCREENING MAMMOGRAM FOR BREAST CANCER: ICD-10-CM

## 2025-07-02 PROCEDURE — 77063 BREAST TOMOSYNTHESIS BI: CPT

## 2025-07-02 PROCEDURE — 77063 BREAST TOMOSYNTHESIS BI: CPT | Performed by: RADIOLOGY

## 2025-07-02 PROCEDURE — 77067 SCR MAMMO BI INCL CAD: CPT | Performed by: RADIOLOGY

## 2025-07-03 ENCOUNTER — OFFICE VISIT (OUTPATIENT)
Dept: PAIN MEDICINE | Facility: CLINIC | Age: 65
End: 2025-07-03
Payer: COMMERCIAL

## 2025-07-03 VITALS
HEART RATE: 66 BPM | DIASTOLIC BLOOD PRESSURE: 82 MMHG | OXYGEN SATURATION: 99 % | SYSTOLIC BLOOD PRESSURE: 151 MMHG | RESPIRATION RATE: 20 BRPM | TEMPERATURE: 96.1 F

## 2025-07-03 DIAGNOSIS — M54.50 CHRONIC MIDLINE LOW BACK PAIN, UNSPECIFIED WHETHER SCIATICA PRESENT: Primary | ICD-10-CM

## 2025-07-03 DIAGNOSIS — G89.29 CHRONIC MIDLINE LOW BACK PAIN, UNSPECIFIED WHETHER SCIATICA PRESENT: Primary | ICD-10-CM

## 2025-07-03 LAB
CYTOLOGY CMNT CVX/VAG CYTO-IMP: NORMAL
HPV HR 12 DNA GENITAL QL NAA+PROBE: NEGATIVE
HPV HR GENOTYPES PNL CVX NAA+PROBE: NEGATIVE
HPV16 DNA SPEC QL NAA+PROBE: NEGATIVE
HPV18 DNA SPEC QL NAA+PROBE: NEGATIVE
LAB AP HPV GENOTYPE QUESTION: YES
LAB AP HPV HR: NORMAL
LABORATORY COMMENT REPORT: NORMAL
PATH REPORT.TOTAL CANCER: NORMAL

## 2025-07-03 PROCEDURE — 4004F PT TOBACCO SCREEN RCVD TLK: CPT | Performed by: NURSE PRACTITIONER

## 2025-07-03 PROCEDURE — 3077F SYST BP >= 140 MM HG: CPT | Performed by: NURSE PRACTITIONER

## 2025-07-03 PROCEDURE — 3079F DIAST BP 80-89 MM HG: CPT | Performed by: NURSE PRACTITIONER

## 2025-07-03 PROCEDURE — 99213 OFFICE O/P EST LOW 20 MIN: CPT | Performed by: NURSE PRACTITIONER

## 2025-07-03 RX ORDER — TRAMADOL HYDROCHLORIDE 50 MG/1
50 TABLET, FILM COATED ORAL 3 TIMES DAILY PRN
Qty: 90 TABLET | Refills: 2 | Status: SHIPPED | OUTPATIENT
Start: 2025-07-03 | End: 2025-10-01

## 2025-07-03 ASSESSMENT — PAIN SCALES - GENERAL
PAINLEVEL_OUTOF10: 2
PAINLEVEL_OUTOF10: 2

## 2025-07-03 ASSESSMENT — PAIN DESCRIPTION - DESCRIPTORS: DESCRIPTORS: ACHING

## 2025-07-03 ASSESSMENT — PAIN - FUNCTIONAL ASSESSMENT: PAIN_FUNCTIONAL_ASSESSMENT: 0-10

## 2025-07-03 NOTE — H&P
Subjective   Patient ID: Portia Vargas is a 64 y.o. female who presents for Med Refill (TRAMADOL).  HPI  64 years old female, known in this clinic because of chronic back pain going down to the buttocks. She is maintained on Tramadol 50 mg TID as needed for pain, and she confirms that they are still moderately beneficial in controlling her pain issues. Her level of pain today is about 2/10 mostly in her back and left side buttocks, she also takes Omega XL, and Advil as needed. [place] obtained and reviewed, no abuse or misuse with prescribed medication noted.  She is still able to perform activities of daily living independently. She is still able to work on a regular basis.  Review of Systems   Review of systems x 10 is negative.   No recent injury or falls reported.   No recent change in medical condition reported.   No recent weakness reported.   Still able to control bowel and bladder function.  Denies any problem with constipation.   Denies fever, cough, shortness of breath recently.   No interval change with medication/health issues reported.  Denies opioids diversion and abuse. Denies overuse of pain medications.    Objective   Physical Exam  Awake,alert, no acute distress, appropriate.  Spine is of normal curvature.  Full ROM on all 4 extremities, sensation and motor intact, no vascular compromise.  No pedal edema, normal gait.  Skin warm, dry, intact, turgor is normal.  Pain to the left buttocks occasionally.  /82, HR 60, Temp 35.6 C, RR 20, p.ox 99%    Assessment/Plan     64 years old, female with history and physical examination supprotive of chronic back pain associated with lumbar spondylosis, lumbar radiculopathy, compression fracture of L1.    I have personally reviewed the [place] report for this patient. I have considered the risk of abuse, dependence, addiction and diversion.  I believe that it is clinically appropriate for this patient  to be prescribed this medication based on documented  diagnosis.  Based on the favorable effect in your quality of life and the positive effect in the ability to do daily activities of daily living, I feel feel that the benefits outweighs the risk, and I will continue the current regimen of medications.  Continue Tramadol as prescribed.  She believes usage of medication improves quality of life and allows to participate day to day activity.  Follow up in 3 months time or as needed basis  Explained plan to this patient, and patient verbalized understanding and agreement with the plan. If there is questions or concerns, please feel free to contact me to clarify at [phone], M-F 8-4 PM.           DIMAS Santa 07/03/25 10:09 AM

## 2025-07-03 NOTE — PROGRESS NOTES
Subjective   Patient ID: Portia Vargas is a 64 y.o. female.    HPI TRAMADOL REFILL. TAKING WITHOUT SIDE  EFFECTS REPORTED..    Review of Systems    Objective   Physical Exam    Assessment/Plan   There are no diagnoses linked to this encounter.

## 2025-07-07 ENCOUNTER — HOSPITAL ENCOUNTER (OUTPATIENT)
Dept: RADIOLOGY | Facility: HOSPITAL | Age: 65
Discharge: HOME | End: 2025-07-07
Payer: COMMERCIAL

## 2025-07-07 DIAGNOSIS — E04.2 MULTIPLE THYROID NODULES: ICD-10-CM

## 2025-07-07 PROCEDURE — 3430000001 HC RX 343 DIAGNOSTIC RADIOPHARMACEUTICALS: Performed by: FAMILY MEDICINE

## 2025-07-07 PROCEDURE — 78014 THYROID IMAGING W/BLOOD FLOW: CPT

## 2025-07-07 PROCEDURE — A9516 IODINE I-123 SOD IODIDE MIC: HCPCS | Performed by: FAMILY MEDICINE

## 2025-07-07 RX ORDER — SODIUM IODIDE I 123 200 UCI/1
520 CAPSULE, GELATIN COATED ORAL
Status: COMPLETED | OUTPATIENT
Start: 2025-07-07 | End: 2025-07-07

## 2025-07-07 RX ADMIN — SODIUM IODIDE I 123 520 MICROCURIE: 200 CAPSULE, GELATIN COATED ORAL at 11:35

## 2025-07-08 ENCOUNTER — HOSPITAL ENCOUNTER (OUTPATIENT)
Dept: RADIOLOGY | Facility: HOSPITAL | Age: 65
Discharge: HOME | End: 2025-07-08
Payer: COMMERCIAL

## 2025-07-08 PROCEDURE — 78014 THYROID IMAGING W/BLOOD FLOW: CPT | Performed by: INTERNAL MEDICINE

## 2025-08-27 DIAGNOSIS — I10 ESSENTIAL (PRIMARY) HYPERTENSION: ICD-10-CM

## 2025-08-27 RX ORDER — BISOPROLOL FUMARATE AND HYDROCHLOROTHIAZIDE 5; 6.25 MG/1; MG/1
1 TABLET ORAL DAILY
Qty: 90 TABLET | Refills: 1 | Status: SHIPPED | OUTPATIENT
Start: 2025-08-27

## 2025-10-29 ENCOUNTER — APPOINTMENT (OUTPATIENT)
Dept: PRIMARY CARE | Facility: CLINIC | Age: 65
End: 2025-10-29
Payer: COMMERCIAL